# Patient Record
Sex: FEMALE | URBAN - METROPOLITAN AREA
[De-identification: names, ages, dates, MRNs, and addresses within clinical notes are randomized per-mention and may not be internally consistent; named-entity substitution may affect disease eponyms.]

---

## 2021-04-28 ENCOUNTER — OFFICE VISIT (OUTPATIENT)
Dept: PULMONOLOGY | Age: 66
End: 2021-04-28
Payer: COMMERCIAL

## 2021-04-28 VITALS
SYSTOLIC BLOOD PRESSURE: 120 MMHG | WEIGHT: 184 LBS | HEIGHT: 65 IN | HEART RATE: 76 BPM | BODY MASS INDEX: 30.66 KG/M2 | DIASTOLIC BLOOD PRESSURE: 84 MMHG

## 2021-04-28 DIAGNOSIS — G47.33 OBSTRUCTIVE SLEEP APNEA SYNDROME: Primary | ICD-10-CM

## 2021-04-28 DIAGNOSIS — E11.9 CONTROLLED TYPE 2 DIABETES MELLITUS WITHOUT COMPLICATION, WITHOUT LONG-TERM CURRENT USE OF INSULIN (HCC): Chronic | ICD-10-CM

## 2021-04-28 DIAGNOSIS — I10 ESSENTIAL HYPERTENSION: Chronic | ICD-10-CM

## 2021-04-28 DIAGNOSIS — E66.9 NON MORBID OBESITY, UNSPECIFIED OBESITY TYPE: Chronic | ICD-10-CM

## 2021-04-28 PROCEDURE — G8417 CALC BMI ABV UP PARAM F/U: HCPCS | Performed by: INTERNAL MEDICINE

## 2021-04-28 PROCEDURE — G8427 DOCREV CUR MEDS BY ELIG CLIN: HCPCS | Performed by: INTERNAL MEDICINE

## 2021-04-28 PROCEDURE — 2022F DILAT RTA XM EVC RTNOPTHY: CPT | Performed by: INTERNAL MEDICINE

## 2021-04-28 PROCEDURE — 99244 OFF/OP CNSLTJ NEW/EST MOD 40: CPT | Performed by: INTERNAL MEDICINE

## 2021-04-28 RX ORDER — BIOTIN 1 MG
TABLET ORAL
COMMUNITY

## 2021-04-28 RX ORDER — VITAMIN B COMPLEX
1 CAPSULE ORAL DAILY
COMMUNITY

## 2021-04-28 RX ORDER — LOSARTAN POTASSIUM 50 MG/1
50 TABLET ORAL DAILY
COMMUNITY
Start: 2021-01-05

## 2021-04-28 ASSESSMENT — ENCOUNTER SYMPTOMS
ABDOMINAL PAIN: 0
RHINORRHEA: 0
ABDOMINAL DISTENTION: 0
CHEST TIGHTNESS: 0
APNEA: 1
VOMITING: 0
EYE PAIN: 0
NAUSEA: 0
ALLERGIC/IMMUNOLOGIC NEGATIVE: 1
PHOTOPHOBIA: 0
SHORTNESS OF BREATH: 0
CHOKING: 0

## 2021-04-28 ASSESSMENT — SLEEP AND FATIGUE QUESTIONNAIRES
HOW LIKELY ARE YOU TO NOD OFF OR FALL ASLEEP IN A CAR, WHILE STOPPED FOR A FEW MINUTES IN TRAFFIC: 0
HOW LIKELY ARE YOU TO NOD OFF OR FALL ASLEEP WHILE WATCHING TV: 2
HOW LIKELY ARE YOU TO NOD OFF OR FALL ASLEEP WHILE SITTING AND READING: 3

## 2021-04-28 NOTE — LETTER
meds for: HTN and DM. Pt would medically benefit from wt loss for ZEUS (diet, exercise, surgical). Orders Placed This Encounter   Procedures    Sleep Study with PAP Titration         If you have questions or concerns, please do not hesitate to call me. I look forward to following Walter Journey along with you.     Sincerely,      Ramya Escobedo MD    CC providers:  MD [id number] [facility] [address]  Via Fax: [phone]

## 2021-04-28 NOTE — PROGRESS NOTES
Chelita Troncoso MD, Edmundo Nix, [facility]  Tiffanie Kehrt CNP  Laura Jordan CNP Jo[address] [place] [address]  [address] [facility], [address] [facility] [phone]   [facility] [address]non Aceves. [address] [facility]. [address] [phone]     [place] [address]  Dept: [phone]  Loc: [phone]    Assessment:      Visit Diagnoses and Associated Orders     Obstructive sleep apnea syndrome   (New Problem)  -  Primary    Old records reviewed, needs treatment    Sleep Study with PAP Titration [26215 Custom]   - Future Order         Essential hypertension   (Stable)      losartan (COZAAR) 50 MG tablet [15754]           Controlled type 2 diabetes mellitus without complication, without long-term current use of insulin (HCC)   (Stable)           Non morbid obesity, unspecified obesity type   (Not Stable)           ORDERS WITHOUT AN ASSOCIATED DIAGNOSIS    Biotin 1000 MCG TABS [54111]      vitamin D 25 MCG (1000 UT) CAPS [01894]      Multiple Vitamins-Minerals (MULTIVITAMIN ADULTS PO) [060419]      Cyanocobalamin (VITAMIN B 12 PO) [87518]      b complex vitamins capsule [804]             Plan:      Reviewed old records, pertinent data listed in history. Reviewed ZEUS: pathophysiology, diagnosis, complications and treatment. Instructed her not to drive if drowsy. Continue medications per her PCP and other physicians. Limit caffeine use after 3pm. Given severity of his Sx and medical Hx as well has very high probability for ZEUS will do split-night to expedite therapy for his ZEUS. 8 wk f/u after titration. The chronic medical conditions listed are directly related to the primary diagnosis listed above. The management of the primary diagnosis affects the secondary diagnosis and vice versa. This information was analyzed to assess complexity and medical decision making in regards to further testing and management. Continue meds for: HTN and DM.  Pt would medically benefit from wt loss for ZEUS (diet, exercise, surgical). Orders Placed This Encounter   Procedures    Sleep Study with PAP Titration          Subjective:     Patient ID: Gaby Parr is a 60 y.o. female. Chief Complaint   Patient presents with    Snoring     h/o of mild sleep apnea        HPI:      Gaby Parr is a 60 y.o. female referred by Jenny Song MD for a sleep evaluation. She complains of: snoring, excessive daytime sleepiness , non-restorative sleep, nocturia, napping and tossing and turning at night. She denies: cataplexy and hypnagogic hallucinations. Symptoms began 8 years ago, gradually worsening since that time. Previous evaluation and treatment has included-HST done 4/6/15 showed CMS KENDY-11.8/hr. Never offered treatment. Chronic stable medical conditions: HTN and DM  Chronic unstable medical conditions: obesity    DOT/CDL - No  FAA/'s license -No    Previous Report(s) Reviewed: historical medical records, office notes, andreferral letter(s). Pertinent data has been documented. [place] - Total score: 10    Caffeine Intake - None.     Social History     Socioeconomic History    Marital status:      Spouse name: Not on file    Number of children: Not on file    Years of education: Not on file    Highest education level: Not on file   Occupational History    Not on file   Social Needs    Financial resource strain: Not on file    Food insecurity     Worry: Not on file     Inability: Not on file    Transportation needs     Medical: Not on file     Non-medical: Not on file   Tobacco Use    Smoking status: Never Smoker    Smokeless tobacco: Never Used   Substance and Sexual Activity    Alcohol use: Not Currently    Drug use: Never    Sexual activity: Not on file   Lifestyle    Physical activity     Days per week: Not on file     Minutes per session: Not on file    Stress: Not on file   Relationships    Social connections     Talks on phone: Not on file     Gets together: Not on file     Attends Mandaen service: Not on file     Active member of club or organization: Not on file     Attends meetings of clubs or organizations: Not on file     Relationship status: Not on file    Intimate partner violence     Fear of current or ex partner: Not on file     Emotionally abused: Not on file     Physically abused: Not on file     Forced sexual activity: Not on file   Other Topics Concern    Not on file   Social History Narrative    Not on file        Current Outpatient Medications   Medication Sig Dispense Refill    losartan (COZAAR) 50 MG tablet Take 50 mg by mouth daily      Biotin 1000 MCG TABS Take by mouth      vitamin D 25 MCG (1000 UT) CAPS Take by mouth      Multiple Vitamins-Minerals (MULTIVITAMIN ADULTS PO) Take by mouth      Cyanocobalamin (VITAMIN B 12 PO) Take by mouth      b complex vitamins capsule Take 1 capsule by mouth daily       No current facility-administered medications for this visit.         Allergies as of 2021    (No Known Allergies)       Patient Active Problem List   Diagnosis    Essential hypertension    Controlled type 2 diabetes mellitus without complication, without long-term current use of insulin ([address]    Obstructive sleep apnea syndrome    Non morbid obesity, unspecified obesity type       Past Medical History:   Diagnosis Date    Controlled type 2 diabetes mellitus without complication, without long-term current use of insulin ([address] 10/12/2019    Essential hypertension 2018    HTN (hypertension)     Obstructive sleep apnea syndrome 2021    Old records reviewed, needs treatment    Prediabetes        Past Surgical History:   Procedure Laterality Date     SECTION      CHOLECYSTECTOMY, LAPAROSCOPIC      HERNIA REPAIR      MANDIBLE FRACTURE SURGERY      NASAL SEPTUM SURGERY         Family History   Problem Relation Age of Onset    Sleep Apnea Sister     Sleep Apnea Brother  Sleep Apnea Brother        Review of Systems   Constitutional: Positive for fatigue. Negative for activity change and appetite change. HENT: Positive for congestion. Negative for nosebleeds, postnasal drip, rhinorrhea and sneezing. Eyes: Negative for photophobia, pain and visual disturbance. Respiratory: Positive for apnea. Negative for choking, chest tightness and shortness of breath. Cardiovascular: Negative. Gastrointestinal: Negative for abdominal distention, abdominal pain, nausea and vomiting. Endocrine: Negative for cold intolerance and heat intolerance. Genitourinary: Negative for difficulty urinating, dysuria, frequency and urgency. Musculoskeletal: Negative. Negative for neck pain and neck stiffness. Skin: Negative. Allergic/Immunologic: Negative. Neurological: Negative for tremors, seizures, syncope and weakness. Hematological: Negative for adenopathy. Does not bruise/bleed easily. Psychiatric/Behavioral: Positive for sleep disturbance. Negative for agitation, behavioral problems and confusion. Objective:     Vitals:  Weight BMI   Wt Readings from Last 3 Encounters:   04/28/21 184 lb (83.5 kg)    Body mass index is 30.62 kg/m². BP HR SaO2   BP Readings from Last 3 Encounters:   04/28/21 120/84    Pulse Readings from Last 3 Encounters:   04/28/21 76    SpO2 Readings from Last 3 Encounters:   No data found for SpO2        Physical Exam  Vitals signs reviewed. Constitutional:       General: She is not in acute distress. Appearance: Normal appearance. She is well-developed. She is not toxic-appearing or diaphoretic. HENT:      Head: Normocephalic and atraumatic. Not macrocephalic and not microcephalic. Right Ear: External ear normal.      Left Ear: External ear normal.      Nose: Nasal deformity, septal deviation and mucosal edema present. Mouth/Throat:      Lips: Pink. Mouth: Mucous membranes are moist.      Tongue: No lesions.       Palate: No mass. Pharynx: Uvula midline. No oropharyngeal exudate or uvula swelling. Tonsils: No tonsillar exudate or tonsillar abscesses. Comments: Tonsils: normal size  Eyes:      General: Lids are normal.      Extraocular Movements: Extraocular movements intact. Conjunctiva/sclera: Conjunctivae normal.      Pupils: Pupils are equal, round, and reactive to light. Neck:      Musculoskeletal: Normal range of motion. Vascular: No JVD. Trachea: Trachea normal.      Comments: Neck Circ: 15 inches    Cardiovascular:      Rate and Rhythm: Normal rate and regular rhythm. Heart sounds: Normal heart sounds. Pulmonary:      Effort: Pulmonary effort is normal.      Breath sounds: Normal breath sounds. Abdominal:      General: Bowel sounds are normal.   Musculoskeletal:      Comments: No evidence of cyanosis or clubbing of nails   Skin:     General: Skin is warm. Nails: There is no clubbing. Neurological:      General: No focal deficit present. Mental Status: She is alert. Psychiatric:         Attention and Perception: Attention normal.         Mood and Affect: Mood and affect normal.         Speech: Speech normal.         Behavior: Behavior normal.         Thought Content:  Thought content normal.         Electronically signed by Srinivasa Clement MD on4/28/2021 at 2:44 PM

## 2021-04-29 ENCOUNTER — TELEPHONE (OUTPATIENT)
Dept: SLEEP CENTER | Age: 66
End: 2021-04-29

## 2021-04-29 NOTE — TELEPHONE ENCOUNTER
Called to schedule a split night per Mir Almonte. Left  for the pt to return my call.      [employer] insurance

## 2021-05-03 DIAGNOSIS — Z20.822 COVID-19 RULED OUT: Primary | ICD-10-CM

## 2021-05-13 ENCOUNTER — HOSPITAL ENCOUNTER (OUTPATIENT)
Dept: SLEEP CENTER | Age: 66
Discharge: HOME | End: 2021-05-13
Payer: COMMERCIAL

## 2021-05-13 DIAGNOSIS — G47.33 OBSTRUCTIVE SLEEP APNEA SYNDROME: ICD-10-CM

## 2021-05-13 PROCEDURE — 95811 POLYSOM 6/>YRS CPAP 4/> PARM: CPT | Performed by: INTERNAL MEDICINE

## 2021-05-13 PROCEDURE — 95811 POLYSOM 6/>YRS CPAP 4/> PARM: CPT

## 2021-05-18 DIAGNOSIS — G47.33 OBSTRUCTIVE SLEEP APNEA SYNDROME: Primary | ICD-10-CM

## 2021-05-19 ENCOUNTER — TELEPHONE (OUTPATIENT)
Dept: PULMONOLOGY | Age: 66
End: 2021-05-19

## 2021-05-25 ENCOUNTER — TELEPHONE (OUTPATIENT)
Dept: PULMONOLOGY | Age: 66
End: 2021-05-25

## 2021-05-25 NOTE — TELEPHONE ENCOUNTER
Trying to return pt call to review test results. Message left at [phone]. Pt returned call within minutes. Results were reviewed and to schedule a titration study.

## 2021-06-03 ENCOUNTER — HOSPITAL ENCOUNTER (OUTPATIENT)
Dept: SLEEP CENTER | Age: 66
Discharge: HOME | End: 2021-06-03
Payer: COMMERCIAL

## 2021-06-03 DIAGNOSIS — G47.33 OBSTRUCTIVE SLEEP APNEA SYNDROME: ICD-10-CM

## 2021-06-03 PROCEDURE — 95811 POLYSOM 6/>YRS CPAP 4/> PARM: CPT | Performed by: INTERNAL MEDICINE

## 2021-06-03 PROCEDURE — 95811 POLYSOM 6/>YRS CPAP 4/> PARM: CPT

## 2021-06-09 ENCOUNTER — TELEPHONE (OUTPATIENT)
Dept: PULMONOLOGY | Age: 66
End: 2021-06-09

## 2021-06-10 ENCOUNTER — TELEPHONE (OUTPATIENT)
Dept: PULMONOLOGY | Age: 66
End: 2021-06-10

## 2021-06-10 NOTE — TELEPHONE ENCOUNTER
Pt returning call to review titration study. Order to be sent to [facility] F/U scheduled.  Copy of study faxed to pt at [facility] per her request.

## 2021-06-10 NOTE — PROGRESS NOTES
Sergio Argueta         : 1955  [place]    Diagnosis: [x] ZEUS (G47.33) [] CSA (G47.31) [] Apnea (G47.30)   Length of Need: [x] 12 Months [] 99 Months [] Other:    Machine (HONEY!): [x] Respironics Dream Station   2   Auto [] ResMed AirSense     Auto [] Other:     []  CPAP () [x] Bilevel ()   Mode: [] Auto [] Spontaneous    Mode: [x] Auto [] Spontaneous                IPAP max 25 cmH2O  EPAP min 11 cmH2O  PS min 3 cmH2O  PS max 7 cmH2O             Comfort Settings:   - Ramp Pressure: 5 cmH2O                                        - Ramp time: 15 min                                     -  Flex/EPR - 3 full time                                    - For ResMed Bilevel (TiMax-4 sec   TiMin- 0.2 sec)     Humidifier: [x] Heated ()        [x] Water chamber replacement ()/ 1 per 6 months        Mask:   [] Nasal () /1 per 3 months [x] Full Face () /1 per 3 months   [] Patient choice -Size and fit mask [x] Patient Choice - Size and fit mask   [] Dispense:  [] Dispense:    [] Headgear () / 1 per 3 months [x] Headgear () / 1 per 3 months   [] Replacement Nasal Cushion ()/2 per month [x] Interface Replacement ()/1 per month   [] Replacement Nasal Pillows ()/2 per month         Tubing: [x] Heated ()/1 per 3 months    [] Standard ()/1 per 3 months [] Other:           Filters: [x] Non-disposable ()/1 per 6 months     [x] Ultra-Fine, Disposable ()/2 per month        Miscellaneous: [] Chin Strap ()/ 1 per 6 months [] O2 bleed-in:       LPM   [] Oximetry on CPAP/Bilevel []  Other:    [x] Modem: ()         Start Order Date: 06/10/21    MEDICAL JUSTIFICATION:  I, the undersigned, certify that the above prescribed supplies are medically necessary for this patients wellbeing. In my opinion, the supplies are both reasonable and necessary in reference to accepted standards of medicalpractice in treatment of this patients condition.     Francois Swain, MD      NPI: [id number]       Order Signed Date: 06/10/21    Electronically signed by Tad Love MD on 6/10/2021 at 9:30 AM    [address]  1955  [address] [place] [address]  [phone] (home)   There is no such number on file (mobile). Insurance Info (confirm with patient if correct):  Payor/Plan Subscr  Sex Relation Sub.  Ins. ID Effective Group Num

## 2021-06-21 ENCOUNTER — TELEPHONE (OUTPATIENT)
Dept: PULMONOLOGY | Age: 66
End: 2021-06-21

## 2021-06-21 NOTE — TELEPHONE ENCOUNTER
Noe from [facility] called and wants to know if an Air Curve can be ordered?    Noe @ [facility] [phone]

## 2021-06-21 NOTE — PROGRESS NOTES
Sis Zacarias         : 1955  [place]    Diagnosis: [x] ZEUS (G47.33) [] CSA (G47.31) [] Apnea (G47.30)   Length of Need: [x] 12 Months [] 99 Months [] Other:    Machine (HONEY!): [] Respironics Dream Station   2   Auto [x] ResMed AirSense     Auto [] Other:     []  CPAP () [x] Bilevel ()   Mode: [] Auto [] Spontaneous    Mode: [x] Auto [] Spontaneous                IPAP max 25 cmH2O  EPAP min 11 cmH2O  PS 3 cmH2O             Comfort Settings:   - Ramp Pressure: 5 cmH2O                                        - Ramp time: 15 min                                     -  Flex/EPR - 3 full time                                    - For ResMed Bilevel (TiMax-4 sec   TiMin- 0.2 sec)     Humidifier: [x] Heated ()        [x] Water chamber replacement ()/ 1 per 6 months        Mask:   [] Nasal () /1 per 3 months [x] Full Face () /1 per 3 months   [] Patient choice -Size and fit mask [x] Patient Choice - Size and fit mask   [] Dispense:  [] Dispense:    [] Headgear () / 1 per 3 months [x] Headgear () / 1 per 3 months   [] Replacement Nasal Cushion ()/2 per month [x] Interface Replacement ()/1 per month   [] Replacement Nasal Pillows ()/2 per month         Tubing: [x] Heated ()/1 per 3 months    [] Standard ()/1 per 3 months [] Other:           Filters: [x] Non-disposable ()/1 per 6 months     [x] Ultra-Fine, Disposable ()/2 per month        Miscellaneous: [] Chin Strap ()/ 1 per 6 months [] O2 bleed-in:       LPM   [] Oximetry on CPAP/Bilevel []  Other:    [x] Modem: ()         Start Order Date: 21    MEDICAL JUSTIFICATION:  I, the undersigned, certify that the above prescribed supplies are medically necessary for this patients wellbeing. In my opinion, the supplies are both reasonable and necessary in reference to accepted standards of medicalpractice in treatment of this patients condition.     ASHU Zhu      NPI: [id number] Order Signed Date: 21    Electronically signed by ASHU Valentin on 2021 at 4:21 PM    [address]  1955  [facility]  [phone] (home)   There is no such number on file (mobile). Insurance Info (confirm with patient if correct):  Payor/Plan Subscr  Sex Relation Sub.  Ins. ID Effective Group Num

## 2021-07-30 ENCOUNTER — TELEPHONE (OUTPATIENT)
Dept: PULMONOLOGY | Age: 66
End: 2021-07-30

## 2021-07-30 NOTE — TELEPHONE ENCOUNTER
Patient left a voicemail wanting to speak to [facility]. She stated that she is struggling to use her machine. SHe said it is like riding a motorcycle going down the highway. She said the DME made some adjustments, but was wondering if it could be \"slowed down\"? Please call patient at [phone].

## 2021-08-25 ENCOUNTER — OFFICE VISIT (OUTPATIENT)
Dept: PULMONOLOGY | Age: 66
End: 2021-08-25
Payer: COMMERCIAL

## 2021-08-25 VITALS
OXYGEN SATURATION: 97 % | SYSTOLIC BLOOD PRESSURE: 120 MMHG | BODY MASS INDEX: 31.56 KG/M2 | HEART RATE: 82 BPM | DIASTOLIC BLOOD PRESSURE: 88 MMHG | WEIGHT: 189.4 LBS | HEIGHT: 65 IN

## 2021-08-25 DIAGNOSIS — I10 ESSENTIAL HYPERTENSION: Chronic | ICD-10-CM

## 2021-08-25 DIAGNOSIS — G47.33 OBSTRUCTIVE SLEEP APNEA SYNDROME: Primary | Chronic | ICD-10-CM

## 2021-08-25 DIAGNOSIS — E11.9 CONTROLLED TYPE 2 DIABETES MELLITUS WITHOUT COMPLICATION, WITHOUT LONG-TERM CURRENT USE OF INSULIN (HCC): Chronic | ICD-10-CM

## 2021-08-25 DIAGNOSIS — E66.9 NON MORBID OBESITY, UNSPECIFIED OBESITY TYPE: Chronic | ICD-10-CM

## 2021-08-25 PROCEDURE — G8417 CALC BMI ABV UP PARAM F/U: HCPCS | Performed by: NURSE PRACTITIONER

## 2021-08-25 PROCEDURE — G8400 PT W/DXA NO RESULTS DOC: HCPCS | Performed by: NURSE PRACTITIONER

## 2021-08-25 PROCEDURE — 3046F HEMOGLOBIN A1C LEVEL >9.0%: CPT | Performed by: NURSE PRACTITIONER

## 2021-08-25 PROCEDURE — 4040F PNEUMOC VAC/ADMIN/RCVD: CPT | Performed by: NURSE PRACTITIONER

## 2021-08-25 PROCEDURE — 2022F DILAT RTA XM EVC RTNOPTHY: CPT | Performed by: NURSE PRACTITIONER

## 2021-08-25 PROCEDURE — G8427 DOCREV CUR MEDS BY ELIG CLIN: HCPCS | Performed by: NURSE PRACTITIONER

## 2021-08-25 PROCEDURE — 1036F TOBACCO NON-USER: CPT | Performed by: NURSE PRACTITIONER

## 2021-08-25 PROCEDURE — 1123F ACP DISCUSS/DSCN MKR DOCD: CPT | Performed by: NURSE PRACTITIONER

## 2021-08-25 PROCEDURE — 99214 OFFICE O/P EST MOD 30 MIN: CPT | Performed by: NURSE PRACTITIONER

## 2021-08-25 PROCEDURE — 1090F PRES/ABSN URINE INCON ASSESS: CPT | Performed by: NURSE PRACTITIONER

## 2021-08-25 PROCEDURE — 3017F COLORECTAL CA SCREEN DOC REV: CPT | Performed by: NURSE PRACTITIONER

## 2021-08-25 ASSESSMENT — SLEEP AND FATIGUE QUESTIONNAIRES
HOW LIKELY ARE YOU TO NOD OFF OR FALL ASLEEP WHILE SITTING AND READING: 3
HOW LIKELY ARE YOU TO NOD OFF OR FALL ASLEEP WHILE SITTING AND TALKING TO SOMEONE: 0
HOW LIKELY ARE YOU TO NOD OFF OR FALL ASLEEP WHILE LYING DOWN TO REST IN THE AFTERNOON WHEN CIRCUMSTANCES PERMIT: 3
HOW LIKELY ARE YOU TO NOD OFF OR FALL ASLEEP WHEN YOU ARE A PASSENGER IN A CAR FOR AN HOUR WITHOUT A BREAK: 1
HOW LIKELY ARE YOU TO NOD OFF OR FALL ASLEEP WHILE SITTING INACTIVE IN A PUBLIC PLACE: 0
ESS TOTAL SCORE: 11
HOW LIKELY ARE YOU TO NOD OFF OR FALL ASLEEP WHILE WATCHING TV: 3
HOW LIKELY ARE YOU TO NOD OFF OR FALL ASLEEP IN A CAR, WHILE STOPPED FOR A FEW MINUTES IN TRAFFIC: 0
HOW LIKELY ARE YOU TO NOD OFF OR FALL ASLEEP WHILE SITTING QUIETLY AFTER LUNCH WITHOUT ALCOHOL: 1

## 2021-08-25 NOTE — ASSESSMENT & PLAN NOTE
Chronic-Improving: Reviewed PSG Split night and PSG with titration study with patient and provided a copy for her records. Reviewed and analyzed results of physiologic download from patient's machine and reviewed with patient. Supplies and parts as needed for her machine. These are medically necessary. Limit caffeine use after 3pm. Based on the analyzed data will change following settings: EPAP min to 12. Humidity to 6. Verbal and written instruction on PAP equipment cleaning and disinfection schedule provided. Follow up in 3 months or sooner if issues arise.

## 2021-08-25 NOTE — LETTER
[facility]  [place] [address] [place] [address]  Phone: [phone]  Fax: [phone]    August 25, 2021       Patient: Sammie Harmon   MR Number: [id number]   YOB: 1955   Date of Visit: 8/25/2021       Ajit Moore was seen for a follow up visit today. Here is my assessment and plan as well as an attached copy of her visit today:    Non morbid obesity, unspecified obesity type  Chronic-not stable:  Discussed importance of treating obstructive sleep apnea and getting sufficient sleep to assist with weight control. Encouraged her to work on weight loss through diet and exercise. Recommended DASH or Mediterranean diets. Controlled type 2 diabetes mellitus without complication, without long-term current use of insulin (HCC)  Chronic- Stable. Discussed the importance of treating obstructive sleep apnea as part of the management of this disorder. Cont any meds per PCP and other physicians. Essential hypertension   Chronic- Stable. Discussed the importance of treating obstructive sleep apnea as part of the management of this disorder. Cont any meds per PCP and other physicians. Obstructive sleep apnea syndrome   Chronic-Improving: Reviewed PSG Split night and PSG with titration study with patient and provided a copy for her records. Reviewed and analyzed results of physiologic download from patient's machine and reviewed with patient. Supplies and parts as needed for her machine. These are medically necessary. Limit caffeine use after 3pm. Based on the analyzed data will change following settings: EPAP min to 12. Humidity to 6. Verbal and written instruction on PAP equipment cleaning and disinfection schedule provided. Follow up in 3 months or sooner if issues arise. If you have questions or concerns, please do not hesitate to call me. I look forward to following Judy Garzon along with you.     Sincerely,    ASHU Dillon    CC providers: MD [id number]  [place] [address]  Via Fax: [phone]

## 2021-08-25 NOTE — PROGRESS NOTES
Dixie Pena MD, Maisha Silverman, [facility]  Tiffanie Kehrt CNP Lorine Reamer CNP [address]  Brian Zhang [address] [facility], [address] [facility] [phone]   [facility] [address]  Brian Vicente. [facility]. [place] [address] [phone] [address] [facility] [address] [phone]      Assessment/Plan:     1. Obstructive sleep apnea syndrome  Assessment & Plan:   Chronic-Improving: Reviewed PSG Split night and PSG with titration study with patient and provided a copy for her records. Reviewed and analyzed results of physiologic download from patient's machine and reviewed with patient. Supplies and parts as needed for her machine. These are medically necessary. Limit caffeine use after 3pm. Based on the analyzed data will change following settings: EPAP min to 12. Humidity to 6. Verbal and written instruction on PAP equipment cleaning and disinfection schedule provided. Follow up in 3 months or sooner if issues arise. 2. Essential hypertension  Assessment & Plan:   Chronic- Stable. Discussed the importance of treating obstructive sleep apnea as part of the management of this disorder. Cont any meds per PCP and other physicians. 3. Controlled type 2 diabetes mellitus without complication, without long-term current use of insulin (HCC)  Assessment & Plan:  Chronic- Stable. Discussed the importance of treating obstructive sleep apnea as part of the management of this disorder. Cont any meds per PCP and other physicians. 4. Non morbid obesity, unspecified obesity type  Assessment & Plan:  Chronic-not stable:  Discussed importance of treating obstructive sleep apnea and getting sufficient sleep to assist with weight control. Encouraged her to work on weight loss through diet and exercise. Recommended DASH or Mediterranean diets. Reviewed, analyzed, and documented physiologic data from patient's PAP machine.     This information was analyzed to assess complexity and medical decision making in regards to further testing and management. The primary encounter diagnosis was Obstructive sleep apnea syndrome. Diagnoses of Essential hypertension, Controlled type 2 diabetes mellitus without complication, without long-term current use of insulin ([facility] [address] and Non morbid obesity, unspecified obesity type were also pertinent to this visit. The chronic medical conditions listed are directly related to the primary diagnosis listed above. The management of the primary diagnosis affects the secondary diagnosis and vice versa. Subjective:   Subjective   Patient ID: Rod Price is a 73 y.o. female. Chief Complaint   Patient presents with    Sleep Apnea       HPI:  Machine Modem/Download Info:  Compliance (hours/night): 3.99 hrs/night  % of nights >= 4 hrs: 74 %  Download AHI (/hour): 4.2 /HR   Average IPAP Pressure: 16.3 cmH2O  Average EPAP Pressure: 13.3 cmH2O               AUTO BILEVEL - Settings (ResMed)  IPAP Max: 25 cmH2O  EPAP Min: 11 cmH2O  Pressure Support: 3       Comfort Settings  Heated Tubing (Yes/No): Yes  Flex/EPR (0-3): 3 PAP Mask  Mask Type: Full Face mask  Mask Model: [id number]  Mask Size: M  Clinically Relevant Leak: Yes     Rod Price is doing well acclimating to her machine. She has noticed she is less sleepy during the day since she started her machine. Some times she wakes in the middle of the night and feels short of breath. Other times she will wake and if she has had 4 hours of use she will take her mask off. Some oral dryness. She has not adjusted her moisture settings. Pressure feels good and she is waking rested. she denies headaches, congestion, nosebleeds, aerophagia, or drowsiness while driving. Was having some mask issues but has been better since changing to a different mask. Had polysomnography on 5/13/21 which showed AHI-64/hr with low sat-9% and time below 90% of 0 min.  Had titration done on 5/13/21 where thearapeutic portion of the split night study showed minimal sleep and insufficient data to find a therapeutic CPAP pressure. No REM seen on this portion of the study. Recommend patient return for a formal CPAP titration. Titration on 6/3/21 showed improved sleep related breathing disorder with Bilevel. Patient failed CPAP and was changed to bilevel. Recommend IPAP max: 25, EPAP min: 11, PS min: 3, PS max: 7    DME [address]    [place] - Total score: 11    Social History     Socioeconomic History    Marital status:      Spouse name: Not on file    Number of children: Not on file    Years of education: Not on file    Highest education level: Not on file   Occupational History    Not on file   Tobacco Use    Smoking status: Never Smoker    Smokeless tobacco: Never Used   Vaping Use    Vaping Use: Never used   Substance and Sexual Activity    Alcohol use: Not Currently    Drug use: Never    Sexual activity: Not on file   Other Topics Concern    Not on file   Social History Narrative    Not on file     Social Determinants of Health     Financial Resource Strain:     Difficulty of Paying Living Expenses:    Food Insecurity:     Worried About Running Out of Food in the Last Year:     Ran Out of Food in the Last Year:    Transportation Needs:     Lack of Transportation (Medical):      Lack of Transportation (Non-Medical):    Physical Activity:     Days of Exercise per Week:     Minutes of Exercise per Session:    Stress:     Feeling of Stress :    Social Connections:     Frequency of Communication with Friends and Family:     Frequency of Social Gatherings with Friends and Family:     Attends Voodoo Services:     Active Member of Clubs or Organizations:     Attends Club or Organization Meetings:     Marital Status:    Intimate Partner Violence:     Fear of Current or Ex-Partner:     Emotionally Abused:     Physically Abused:     Sexually Abused: Current Outpatient Medications   Medication Instructions    b complex vitamins capsule 1 capsule, Oral, DAILY    Biotin 1000 MCG TABS Oral    Cyanocobalamin (VITAMIN B 12 PO) Oral    losartan (COZAAR) 50 mg, Oral, DAILY    Multiple Vitamins-Minerals (MULTIVITAMIN ADULTS PO) Oral    Probiotic Product (PROBIOTIC ADVANCED PO) Oral    vitamin D 25 MCG (1000 UT) CAPS Oral    Vitamins C E (VITAMIN C & E COMPLEX PO) Oral          Vitals:  Weight BMI   Wt Readings from Last 3 Encounters:   08/25/21 189 lb 6.4 oz (85.9 kg)   04/28/21 184 lb (83.5 kg)    Body mass index is 31.52 kg/m².      BP HR SaO2   BP Readings from Last 3 Encounters:   08/25/21 120/88   04/28/21 120/84    Pulse Readings from Last 3 Encounters:   08/25/21 82   04/28/21 76    SpO2 Readings from Last 3 Encounters:   08/25/21 97%        Electronically signed by ASHU Contreras on 8/25/2021 at 12:58 PM

## 2021-11-17 ENCOUNTER — OFFICE VISIT (OUTPATIENT)
Dept: PULMONOLOGY | Age: 66
End: 2021-11-17
Payer: COMMERCIAL

## 2021-11-17 VITALS
DIASTOLIC BLOOD PRESSURE: 82 MMHG | SYSTOLIC BLOOD PRESSURE: 118 MMHG | HEIGHT: 65 IN | HEART RATE: 79 BPM | BODY MASS INDEX: 31.65 KG/M2 | WEIGHT: 190 LBS | OXYGEN SATURATION: 97 %

## 2021-11-17 DIAGNOSIS — G47.33 OBSTRUCTIVE SLEEP APNEA SYNDROME: Chronic | ICD-10-CM

## 2021-11-17 DIAGNOSIS — I10 ESSENTIAL HYPERTENSION: Chronic | ICD-10-CM

## 2021-11-17 DIAGNOSIS — E11.9 CONTROLLED TYPE 2 DIABETES MELLITUS WITHOUT COMPLICATION, WITHOUT LONG-TERM CURRENT USE OF INSULIN (HCC): Chronic | ICD-10-CM

## 2021-11-17 DIAGNOSIS — E66.9 NON MORBID OBESITY, UNSPECIFIED OBESITY TYPE: Chronic | ICD-10-CM

## 2021-11-17 PROCEDURE — G8427 DOCREV CUR MEDS BY ELIG CLIN: HCPCS | Performed by: NURSE PRACTITIONER

## 2021-11-17 PROCEDURE — 1090F PRES/ABSN URINE INCON ASSESS: CPT | Performed by: NURSE PRACTITIONER

## 2021-11-17 PROCEDURE — 3046F HEMOGLOBIN A1C LEVEL >9.0%: CPT | Performed by: NURSE PRACTITIONER

## 2021-11-17 PROCEDURE — 4040F PNEUMOC VAC/ADMIN/RCVD: CPT | Performed by: NURSE PRACTITIONER

## 2021-11-17 PROCEDURE — 1036F TOBACCO NON-USER: CPT | Performed by: NURSE PRACTITIONER

## 2021-11-17 PROCEDURE — G8484 FLU IMMUNIZE NO ADMIN: HCPCS | Performed by: NURSE PRACTITIONER

## 2021-11-17 PROCEDURE — 1123F ACP DISCUSS/DSCN MKR DOCD: CPT | Performed by: NURSE PRACTITIONER

## 2021-11-17 PROCEDURE — G8400 PT W/DXA NO RESULTS DOC: HCPCS | Performed by: NURSE PRACTITIONER

## 2021-11-17 PROCEDURE — 3017F COLORECTAL CA SCREEN DOC REV: CPT | Performed by: NURSE PRACTITIONER

## 2021-11-17 PROCEDURE — 2022F DILAT RTA XM EVC RTNOPTHY: CPT | Performed by: NURSE PRACTITIONER

## 2021-11-17 PROCEDURE — G8417 CALC BMI ABV UP PARAM F/U: HCPCS | Performed by: NURSE PRACTITIONER

## 2021-11-17 PROCEDURE — 99214 OFFICE O/P EST MOD 30 MIN: CPT | Performed by: NURSE PRACTITIONER

## 2021-11-17 ASSESSMENT — SLEEP AND FATIGUE QUESTIONNAIRES
HOW LIKELY ARE YOU TO NOD OFF OR FALL ASLEEP IN A CAR, WHILE STOPPED FOR A FEW MINUTES IN TRAFFIC: 0
HOW LIKELY ARE YOU TO NOD OFF OR FALL ASLEEP WHILE SITTING INACTIVE IN A PUBLIC PLACE: 0
HOW LIKELY ARE YOU TO NOD OFF OR FALL ASLEEP WHILE WATCHING TV: 3
HOW LIKELY ARE YOU TO NOD OFF OR FALL ASLEEP WHILE SITTING QUIETLY AFTER LUNCH WITHOUT ALCOHOL: 0
HOW LIKELY ARE YOU TO NOD OFF OR FALL ASLEEP WHILE LYING DOWN TO REST IN THE AFTERNOON WHEN CIRCUMSTANCES PERMIT: 3
HOW LIKELY ARE YOU TO NOD OFF OR FALL ASLEEP WHEN YOU ARE A PASSENGER IN A CAR FOR AN HOUR WITHOUT A BREAK: 1
HOW LIKELY ARE YOU TO NOD OFF OR FALL ASLEEP WHILE SITTING AND TALKING TO SOMEONE: 0
ESS TOTAL SCORE: 10
HOW LIKELY ARE YOU TO NOD OFF OR FALL ASLEEP WHILE SITTING AND READING: 3

## 2021-11-17 NOTE — LETTER
[facility] Sleep Medicine  [place] [address] [place] [address]  Phone: [phone]  Fax: [phone]    November 17, 2021       Patient: Eva Burnette   MR Number: [id number]   YOB: 1955   Date of Visit: 11/17/2021       Johan Vizcarra was seen for a follow up visit today. Here is my assessment and plan as well as an attached copy of her visit today:    Obstructive sleep apnea syndrome  Chronic-Stable: Reviewed and analyzed results of physiologic download from patient's machine and reviewed with patient. Supplies and parts as needed for her machine. These are medically necessary. Limit caffeine use after 3pm. Based on the analyzed data will continue with current settings. Patient to work with DME on mask fit and comfort. She is considering [employer]ire. She works at [employer] and may like to see someone there. She will continue working on mask fit in the interim. Verbal and written instruction on PAP equipment cleaning and disinfection schedule provided. Instructed not to drive unless had 4 hrs of effective therapy for her ZEUS the night before. Did review the risks of under or untreated ZEUS including, but not limited to, higher risks of motor vehicle accidents, stroke, heart attacks, and death. She understands and accepts all these risks. Follow up in 3 months or sooner if issues arise. Essential hypertension   Chronic- Stable. Discussed the importance of treating obstructive sleep apnea as part of the management of this disorder. Cont any meds per PCP and other physicians. Controlled type 2 diabetes mellitus without complication, without long-term current use of insulin (HCC)  Chronic- Stable. Discussed the importance of treating obstructive sleep apnea as part of the management of this disorder. Cont any meds per PCP and other physicians.       Non morbid obesity, unspecified obesity type  Chronic-not stable:  Discussed importance of treating obstructive sleep apnea and getting sufficient sleep to assist with weight control. Encouraged her to work on weight loss through diet and exercise. Recommended DASH or Mediterranean diets. If you have questions or concerns, please do not hesitate to call me. I look forward to following Isamar Nielsen along with you.     Sincerely,    ASHU Hendricks     providers:  MD [id number] [facility] [address]  Via Fax: [phone]

## 2021-11-17 NOTE — ASSESSMENT & PLAN NOTE
Chronic-Stable: Reviewed and analyzed results of physiologic download from patient's machine and reviewed with patient. Supplies and parts as needed for her machine. These are medically necessary. Limit caffeine use after 3pm. Based on the analyzed data will continue with current settings. Patient to work with DME on mask fit and comfort. She is considering [employer]. She works at [employer] and may like to see someone there. She will continue working on mask fit in the interim. Verbal and written instruction on PAP equipment cleaning and disinfection schedule provided. Instructed not to drive unless had 4 hrs of effective therapy for her ZEUS the night before. Did review the risks of under or untreated ZEUS including, but not limited to, higher risks of motor vehicle accidents, stroke, heart attacks, and death. She understands and accepts all these risks. Follow up in 3 months or sooner if issues arise.

## 2022-02-16 ENCOUNTER — OFFICE VISIT (OUTPATIENT)
Dept: PULMONOLOGY | Age: 67
End: 2022-02-16
Payer: COMMERCIAL

## 2022-02-16 VITALS
OXYGEN SATURATION: 96 % | DIASTOLIC BLOOD PRESSURE: 70 MMHG | BODY MASS INDEX: 31.62 KG/M2 | HEIGHT: 65 IN | SYSTOLIC BLOOD PRESSURE: 118 MMHG | HEART RATE: 72 BPM | WEIGHT: 189.8 LBS

## 2022-02-16 DIAGNOSIS — E11.9 CONTROLLED TYPE 2 DIABETES MELLITUS WITHOUT COMPLICATION, WITHOUT LONG-TERM CURRENT USE OF INSULIN (HCC): Chronic | ICD-10-CM

## 2022-02-16 DIAGNOSIS — E66.9 NON MORBID OBESITY, UNSPECIFIED OBESITY TYPE: Chronic | ICD-10-CM

## 2022-02-16 DIAGNOSIS — I10 ESSENTIAL HYPERTENSION: Chronic | ICD-10-CM

## 2022-02-16 DIAGNOSIS — G47.33 OBSTRUCTIVE SLEEP APNEA SYNDROME: Primary | Chronic | ICD-10-CM

## 2022-02-16 PROCEDURE — 99214 OFFICE O/P EST MOD 30 MIN: CPT | Performed by: NURSE PRACTITIONER

## 2022-02-16 ASSESSMENT — SLEEP AND FATIGUE QUESTIONNAIRES
HOW LIKELY ARE YOU TO NOD OFF OR FALL ASLEEP IN A CAR, WHILE STOPPED FOR A FEW MINUTES IN TRAFFIC: 0
HOW LIKELY ARE YOU TO NOD OFF OR FALL ASLEEP WHEN YOU ARE A PASSENGER IN A CAR FOR AN HOUR WITHOUT A BREAK: 1
HOW LIKELY ARE YOU TO NOD OFF OR FALL ASLEEP WHILE SITTING AND TALKING TO SOMEONE: 0
ESS TOTAL SCORE: 7
HOW LIKELY ARE YOU TO NOD OFF OR FALL ASLEEP WHILE SITTING AND READING: 3
HOW LIKELY ARE YOU TO NOD OFF OR FALL ASLEEP WHILE LYING DOWN TO REST IN THE AFTERNOON WHEN CIRCUMSTANCES PERMIT: 0
HOW LIKELY ARE YOU TO NOD OFF OR FALL ASLEEP WHILE WATCHING TV: 3
HOW LIKELY ARE YOU TO NOD OFF OR FALL ASLEEP WHILE SITTING QUIETLY AFTER LUNCH WITHOUT ALCOHOL: 0
HOW LIKELY ARE YOU TO NOD OFF OR FALL ASLEEP WHILE SITTING INACTIVE IN A PUBLIC PLACE: 0

## 2022-02-16 NOTE — LETTER
[facility] Sleep Medicine  [place] [address] [address] [place] [address]  Phone: [phone]  Fax: [phone]    February 16, 2022       Patient: Namrata Alfaro   MR Number: [id number]   YOB: 1955   Date of Visit: 2/16/2022       Hanane Knapp was seen for a follow up visit today. Here is my assessment and plan as well as an attached copy of her visit today:    Essential hypertension  Chronic- Stable. Discussed the importance of treating obstructive sleep apnea as part of the management of this disorder. Cont any meds per PCP and other physicians. Controlled type 2 diabetes mellitus without complication, without long-term current use of insulin (HCC)  Chronic- Stable. Discussed the importance of treating obstructive sleep apnea as part of the management of this disorder. Cont any meds per PCP and other physicians. Non morbid obesity, unspecified obesity type  Chronic-not stable:  Discussed importance of treating obstructive sleep apnea and getting sufficient sleep to assist with weight control. Encouraged her to work on weight loss through diet and exercise. Recommended DASH or Mediterranean diets. Obstructive sleep apnea syndrome  Chronic-Stable: Reviewed and analyzed results of physiologic download from patient's machine and reviewed with patient. Supplies and parts as needed for her machine. These are medically necessary. Limit caffeine use after 3pm. Based on the analyzed data will change following settings: Humidity to 6. Stable on her machine at current settings, getting benefit from the use, and having minimal side effects. Encouraged her to use her machine each night, all night. Recommended she work with her DME on mask fit and comfort and provided resources for her to view different styles of masks and mask liners to help control large leak and assist with oral dryness. Showed her how to adjust her moisture settings on her machine in the office today. Verbal and written instruction on PAP equipment cleaning and disinfection schedule provided. Will see back in 4 months or sooner if issues arise. If you have questions or concerns, please do not hesitate to call me. I look forward to following Cata Naranjo along with you.     Sincerely,    ASHU Valentin     providers:  MD [id number] [facility] [address]  Via Fax: [phone]

## 2022-02-16 NOTE — PROGRESS NOTES
Diagnosis: [x] ZEUS (G47.33) [] CSA (G47.31) [] Apnea (G47.30)   Length of Need: [x] 15 Months [] 99 Months [] Other:   Machine (HONEY!): [] Respironics Dream Station      Auto [] ResMed AirSense     Auto [] Other:     []  CPAP () [] Bilevel ()   Mode: [] Auto [] Spontaneous    Mode: [] Auto [] Spontaneous             Comfort Settings:      Humidifier: [] Heated ()        [x] Water chamber replacement ()/ 1 per 6 months        Mask:   [] Nasal () /1 per 3 months [x] Full Face () /1 per 3 months   [] Patient choice -Size and fit mask [x] Patient Choice - Size and fit mask   [] Dispense: [] Dispense:   [] Headgear () / 1 per 3 months [x] Headgear () / 1 per 3 months   [] Replacement Nasal Cushion ()/2 per month [x] Interface Replacement ()/1 per month   [] Replacement Nasal Pillows ()/2 per month         Tubing: [x] Heated ()/1 per 3 months    [] Standard ()/1 per 3 months [] Other:           Filters: [x] Non-disposable ()/1 per 6 months     [x] Ultra-Fine, Disposable ()/2 per month        Miscellaneous: [] Chin Strap ()/ 1 per 6 months [] O2 bleed-in:        LPM   [] Oxymetry on CPAP/Bilevel []  Other:         Start Order Date: 02/16/22    MEDICAL JUSTIFICATION:  I, the undersigned, certify that the above prescribed supplies are medically necessary for this patients wellbeing. In my opinion, the supplies are both reasonable and necessary in reference to accepted standards of medicalpractice in treatment of this patients condition. Werner Lindo NP    NPI: [id number]       Order Signed Date: 02/16/22  [address] [facility]  Pulmonary, Sleep, and Critical Care    Pulmonary, Sleep, and Critical Care  [address] [place] [address].  [address] [facility], [address] [facility] , [address] [facility]  Phone: [phone]    Fax: [address]  1955  [address]  [phone] (home)   [phone] (mobile)      Insurance Info (confirm with patient if correct):  Payor/Plan Subscr  Sex Relation Sub.  Ins. ID Effective Group Num

## 2022-02-16 NOTE — ASSESSMENT & PLAN NOTE
Chronic-Stable: Reviewed and analyzed results of physiologic download from patient's machine and reviewed with patient. Supplies and parts as needed for her machine. These are medically necessary. Limit caffeine use after 3pm. Based on the analyzed data will change following settings: Humidity to 6. Stable on her machine at current settings, getting benefit from the use, and having minimal side effects. Encouraged her to use her machine each night, all night. Recommended she work with her DME on mask fit and comfort and provided resources for her to view different styles of masks and mask liners to help control large leak and assist with oral dryness. Showed her how to adjust her moisture settings on her machine in the office today. Verbal and written instruction on PAP equipment cleaning and disinfection schedule provided. Will see back in 4 months or sooner if issues arise.

## 2022-02-16 NOTE — PROGRESS NOTES
Thierry Cuello MD, [facility], [facility]  [facility] [address]  [address], [address] [phone]   [facility] [address] Katherine Salguero. [address] [place] [address]. [place] [address] [phone] SLEEP MEDICINE  [address] [place], [place] [id number] [phone]      Assessment/Plan:      1. Obstructive sleep apnea syndrome  Assessment & Plan:  Chronic-Stable: Reviewed and analyzed results of physiologic download from patient's machine and reviewed with patient. Supplies and parts as needed for her machine. These are medically necessary. Limit caffeine use after 3pm. Based on the analyzed data will change following settings: Humidity to 6. Stable on her machine at current settings, getting benefit from the use, and having minimal side effects. Encouraged her to use her machine each night, all night. Recommended she work with her DME on mask fit and comfort and provided resources for her to view different styles of masks and mask liners to help control large leak and assist with oral dryness. Showed her how to adjust her moisture settings on her machine in the office today. Verbal and written instruction on PAP equipment cleaning and disinfection schedule provided. Will see back in 4 months or sooner if issues arise. 2. Essential hypertension  Assessment & Plan:  Chronic- Stable. Discussed the importance of treating obstructive sleep apnea as part of the management of this disorder. Cont any meds per PCP and other physicians. 3. Controlled type 2 diabetes mellitus without complication, without long-term current use of insulin (HCC)  Assessment & Plan:  Chronic- Stable. Discussed the importance of treating obstructive sleep apnea as part of the management of this disorder. Cont any meds per PCP and other physicians.     4. Non morbid obesity, unspecified obesity type  Assessment & Plan:  Chronic-not stable: Discussed importance of treating obstructive sleep apnea and getting sufficient sleep to assist with weight control. Encouraged her to work on weight loss through diet and exercise. Recommended DASH or Mediterranean diets. Reviewed, analyzed, and documented physiologic data from patient's PAP machine. This information was analyzed to assess complexity and medical decision making in regards to further testing and management. The primary encounter diagnosis was Obstructive sleep apnea syndrome. Diagnoses of Essential hypertension, Controlled type 2 diabetes mellitus without complication, without long-term current use of insulin ([facility] [address] and Non morbid obesity, unspecified obesity type were also pertinent to this visit. The chronic medical conditions listed are directly related to the primary diagnosis listed above. The management of the primary diagnosis affects the secondary diagnosis and vice versa. Subjective:   Subjective   Patient ID: Rylie Chaves is a 73 y.o. female. Chief Complaint   Patient presents with    Sleep Apnea       HPI:  Machine Modem/Download Info:  Compliance (hours/night): 4.11 hrs/night  % of nights >= 4 hrs: 81 %  Download AHI (/hour): 2.3 /HR   Average IPAP Pressure: 15.9 cmH2O  Average EPAP Pressure: 12.9 cmH2O               AUTO BILEVEL - Settings (ResMed)  IPAP Max: 25 cmH2O  EPAP Min: 12 cmH2O  Pressure Support: 3       Comfort Settings  Heated Tubing (Yes/No): Yes PAP Mask  Mask Type: Full Face mask  Clinically Relevant Leak: Yes     Rylie Chaves reports she is doing well with her machine. Some weeks she struggles with the machine and others not. Sometimes she will fall asleep without the machine and then wake up to put it on. Sometimes she does not wake up until early in the morning and will only get a few hours of use in. Pressure feels good and she is waking rested for the most part. Mask will leak around her cheeks and into her eyes.  She is also having a lot of oral dryness. She has not tried adjusting the moisture settings on her machine or contacted her DME to try a different mask. She has been waiting for updated supplies and has called the DME a few times and is told they will send them but she never receives them. she denies headaches, congestion, nosebleeds, dryness, aerophagia, or drowsiness while driving. [address]    [place] - Total score: 7    Social History     Socioeconomic History    Marital status:      Spouse name: Not on file    Number of children: Not on file    Years of education: Not on file    Highest education level: Not on file   Occupational History    Not on file   Tobacco Use    Smoking status: Never Smoker    Smokeless tobacco: Never Used   Vaping Use    Vaping Use: Never used   Substance and Sexual Activity    Alcohol use: Not Currently    Drug use: Never    Sexual activity: Not on file   Other Topics Concern    Not on file   Social History Narrative    Not on file     Social Determinants of Health     Financial Resource Strain:     Difficulty of Paying Living Expenses: Not on file   Food Insecurity:     Worried About Running Out of Food in the Last Year: Not on file    [place] of Food in the Last Year: Not on file   Transportation Needs:     Lack of Transportation (Medical): Not on file    Lack of Transportation (Non-Medical):  Not on file   Physical Activity:     Days of Exercise per Week: Not on file    Minutes of Exercise per Session: Not on file   Stress:     Feeling of Stress : Not on file   Social Connections:     Frequency of Communication with Friends and Family: Not on file    Frequency of Social Gatherings with Friends and Family: Not on file    Attends Shinto Services: Not on file    Active Member of Clubs or Organizations: Not on file    Attends Club or Organization Meetings: Not on file    Marital Status: Not on file   Intimate Partner Violence:     Fear of Current or Ex-Partner: Not on file    Emotionally Abused: Not on file    Physically Abused: Not on file    Sexually Abused: Not on file   Housing Stability:     Unable to Pay for Housing in the Last Year: Not on file    Number of Places Lived in the Last Year: Not on file    Unstable Housing in the Last Year: Not on file       Current Outpatient Medications   Medication Instructions    b complex vitamins capsule 1 capsule, Oral, DAILY    Biotin 1000 MCG TABS Oral    Cyanocobalamin (VITAMIN B 12 PO) Oral    losartan (COZAAR) 50 mg, Oral, DAILY    Multiple Vitamins-Minerals (MULTIVITAMIN ADULTS PO) Oral    Probiotic Product (PROBIOTIC ADVANCED PO) Oral    vitamin D 25 MCG (1000 UT) CAPS Oral    Vitamins C E (VITAMIN C & E COMPLEX PO) Oral          Vitals:  Weight BMI   Wt Readings from Last 3 Encounters:   02/16/22 189 lb 12.8 oz (86.1 kg)   11/17/21 190 lb (86.2 kg)   08/25/21 189 lb 6.4 oz (85.9 kg)    Body mass index is 31.58 kg/m².      BP HR SaO2   BP Readings from Last 3 Encounters:   02/16/22 118/70   11/17/21 118/82   08/25/21 120/88    Pulse Readings from Last 3 Encounters:   02/16/22 72   11/17/21 79   08/25/21 82    SpO2 Readings from Last 3 Encounters:   02/16/22 96%   11/17/21 97%   08/25/21 97%        Electronically signed by ASHU Pozo on 2/16/2022 at 5:58 PM

## 2023-01-30 NOTE — PROGRESS NOTES
Lily Valerio MD, [place], [facility]  Tiffanie Kehrt CNP  Glendy Mcnulty CNP [address]  [address] [facility], [address] [facility]- [phone]   [facility] [address] Betty Gaston. [address] [facility]. [place] [address] [phone]     [address] [facility]  [address] [phone]      Assessment/Plan:      1. Obstructive sleep apnea syndrome  Assessment & Plan:  Chronic-Stable: Reviewed and analyzed results of physiologic download from patient's machine and reviewed with patient. Supplies and parts as needed for her machine. These are medically necessary. Limit caffeine use after 3pm. Based on the analyzed data will continue with current settings. Patient to work with DME on mask fit and comfort. She is considering [employer]. She works at [employer] and may like to see someone there. She will continue working on mask fit in the interim. Verbal and written instruction on PAP equipment cleaning and disinfection schedule provided. Instructed not to drive unless had 4 hrs of effective therapy for her ZEUS the night before. Did review the risks of under or untreated ZEUS including, but not limited to, higher risks of motor vehicle accidents, stroke, heart attacks, and death. She understands and accepts all these risks. Follow up in 3 months or sooner if issues arise. 2. Essential hypertension  Assessment & Plan:   Chronic- Stable. Discussed the importance of treating obstructive sleep apnea as part of the management of this disorder. Cont any meds per PCP and other physicians. 3. Controlled type 2 diabetes mellitus without complication, without long-term current use of insulin (HCC)  Assessment & Plan:  Chronic- Stable. Discussed the importance of treating obstructive sleep apnea as part of the management of this disorder. Cont any meds per PCP and other physicians.     4. Non morbid obesity, unspecified obesity type  Assessment & Plan:  Chronic-not stable:  Discussed importance of treating obstructive sleep apnea and getting sufficient sleep to assist with weight control. Encouraged her to work on weight loss through diet and exercise. Recommended DASH or Mediterranean diets. Reviewed, analyzed, and documented physiologic data from patient's PAP machine. This information was analyzed to assess complexity and medical decision making in regards to further testing and management. Diagnoses of Obstructive sleep apnea syndrome, Essential hypertension, Controlled type 2 diabetes mellitus without complication, without long-term current use of insulin ([facility] [address] and Non morbid obesity, unspecified obesity type were pertinent to this visit. The chronic medical conditions listed are directly related to the primary diagnosis listed above. The management of the primary diagnosis affects the secondary diagnosis and vice versa. Subjective:   Subjective   Patient ID: Roel Yeboah is a 73 y.o. female. Chief Complaint   Patient presents with    Sleep Apnea       HPI:  Machine Modem/Download Info:  Compliance (hours/night): 4.54 hrs/night  % of nights >= 4 hrs: 80 %  Download AHI (/hour): 1.4 /HR   Average IPAP Pressure: 15.9 cmH2O  Average EPAP Pressure: 12.9 cmH2O               AUTO BILEVEL - Settings (ResMed)  IPAP Max: 25 cmH2O  EPAP Min: 12 cmH2O  Pressure Support: 3       Comfort Settings  Heated Tubing (Yes/No): Yes PAP Mask  Clinically Relevant Leak: Yes     Roel Yeboah reports she is doing well with her machine. Still struggles to use machine and getting use to the mask. She will count down the time until she gets to 4 hours of use. She didn't use it one night and was able to sleep through the whole night. Pressure feels good and she is waking rested. she denies headaches, congestion, nosebleeds, dryness, aerophagia, or drowsiness while driving. May be interested in [employer].      [facility]    [place] - Total score: 10    Social History     Socioeconomic History    Marital status:      Spouse name: Not on file    Number of children: Not on file    Years of education: Not on file    Highest education level: Not on file   Occupational History    Not on file   Tobacco Use    Smoking status: Never Smoker    Smokeless tobacco: Never Used   Vaping Use    Vaping Use: Never used   Substance and Sexual Activity    Alcohol use: Not Currently    Drug use: Never    Sexual activity: Not on file   Other Topics Concern    Not on file   Social History Narrative    Not on file     Social Determinants of Health     Financial Resource Strain:     Difficulty of Paying Living Expenses: Not on file   Food Insecurity:     Worried About Running Out of Food in the Last Year: Not on file    Peterson of Food in the Last Year: Not on file   Transportation Needs:     Lack of Transportation (Medical): Not on file    Lack of Transportation (Non-Medical):  Not on file   Physical Activity:     Days of Exercise per Week: Not on file    Minutes of Exercise per Session: Not on file   Stress:     Feeling of Stress : Not on file   Social Connections:     Frequency of Communication with Friends and Family: Not on file    Frequency of Social Gatherings with Friends and Family: Not on file    Attends Roman Catholic Services: Not on file    Active Member of [address] [place] [address] [employer] or Organizations: Not on file    Attends Club or Organization Meetings: Not on file    Marital Status: Not on file   Intimate Partner Violence:     Fear of Current or Ex-Partner: Not on file    Emotionally Abused: Not on file    Physically Abused: Not on file    Sexually Abused: Not on file   Housing Stability:     Unable to Pay for Housing in the Last Year: Not on file    Number of [place] in the Last Year: Not on file    Unstable Housing in the Last Year: Not on file       Current Outpatient Medications   Medication Instructions    b complex vitamins capsule 1 capsule, Oral, DAILY    Biotin 1000 MCG TABS Oral    Cyanocobalamin (VITAMIN B 12 PO) Oral    losartan (COZAAR) 50 mg, Oral, DAILY    Multiple Vitamins-Minerals (MULTIVITAMIN ADULTS PO) Oral    Probiotic Product (PROBIOTIC ADVANCED PO) Oral    vitamin D 25 MCG (1000 UT) CAPS Oral    Vitamins C E (VITAMIN C & E COMPLEX PO) Oral          Vitals:  Weight BMI   Wt Readings from Last 3 Encounters:   11/17/21 190 lb (86.2 kg)   08/25/21 189 lb 6.4 oz (85.9 kg)   04/28/21 184 lb (83.5 kg)    Body mass index is 31.62 kg/m².      BP HR SaO2   BP Readings from Last 3 Encounters:   11/17/21 118/82   08/25/21 120/88   04/28/21 120/84    Pulse Readings from Last 3 Encounters:   11/17/21 79   08/25/21 82   04/28/21 76    SpO2 Readings from Last 3 Encounters:   11/17/21 97%   08/25/21 97%        Electronically signed by ASHU Mcconnell on 11/17/2021 at 3:18 PM Drysol Counseling:  I discussed with the patient the risks of drysol/aluminum chloride including but not limited to skin rash, itching, irritation, burning.

## 2023-02-21 ENCOUNTER — OFFICE VISIT (OUTPATIENT)
Dept: PULMONOLOGY | Age: 68
End: 2023-02-21
Payer: COMMERCIAL

## 2023-02-21 VITALS
HEART RATE: 80 BPM | BODY MASS INDEX: 31.49 KG/M2 | WEIGHT: 189 LBS | DIASTOLIC BLOOD PRESSURE: 72 MMHG | HEIGHT: 65 IN | SYSTOLIC BLOOD PRESSURE: 128 MMHG | OXYGEN SATURATION: 95 %

## 2023-02-21 DIAGNOSIS — E66.9 NON MORBID OBESITY, UNSPECIFIED OBESITY TYPE: Chronic | ICD-10-CM

## 2023-02-21 DIAGNOSIS — E11.9 CONTROLLED TYPE 2 DIABETES MELLITUS WITHOUT COMPLICATION, WITHOUT LONG-TERM CURRENT USE OF INSULIN (HCC): Chronic | ICD-10-CM

## 2023-02-21 DIAGNOSIS — I10 ESSENTIAL HYPERTENSION: Chronic | ICD-10-CM

## 2023-02-21 DIAGNOSIS — G47.33 OBSTRUCTIVE SLEEP APNEA SYNDROME: Primary | Chronic | ICD-10-CM

## 2023-02-21 DIAGNOSIS — J30.9 ALLERGIC RHINITIS, UNSPECIFIED SEASONALITY, UNSPECIFIED TRIGGER: ICD-10-CM

## 2023-02-21 PROCEDURE — 3074F SYST BP LT 130 MM HG: CPT | Performed by: INTERNAL MEDICINE

## 2023-02-21 PROCEDURE — 3078F DIAST BP <80 MM HG: CPT | Performed by: INTERNAL MEDICINE

## 2023-02-21 PROCEDURE — 99214 OFFICE O/P EST MOD 30 MIN: CPT | Performed by: INTERNAL MEDICINE

## 2023-02-21 PROCEDURE — 1123F ACP DISCUSS/DSCN MKR DOCD: CPT | Performed by: INTERNAL MEDICINE

## 2023-02-21 ASSESSMENT — SLEEP AND FATIGUE QUESTIONNAIRES
HOW LIKELY ARE YOU TO NOD OFF OR FALL ASLEEP WHILE SITTING AND TALKING TO SOMEONE: 0
HOW LIKELY ARE YOU TO NOD OFF OR FALL ASLEEP WHEN YOU ARE A PASSENGER IN A CAR FOR AN HOUR WITHOUT A BREAK: 2
HOW LIKELY ARE YOU TO NOD OFF OR FALL ASLEEP WHILE SITTING QUIETLY AFTER LUNCH WITHOUT ALCOHOL: 0
HOW LIKELY ARE YOU TO NOD OFF OR FALL ASLEEP WHILE SITTING AND READING: 3
HOW LIKELY ARE YOU TO NOD OFF OR FALL ASLEEP IN A CAR, WHILE STOPPED FOR A FEW MINUTES IN TRAFFIC: 0
HOW LIKELY ARE YOU TO NOD OFF OR FALL ASLEEP WHILE LYING DOWN TO REST IN THE AFTERNOON WHEN CIRCUMSTANCES PERMIT: 3
HOW LIKELY ARE YOU TO NOD OFF OR FALL ASLEEP WHILE SITTING INACTIVE IN A PUBLIC PLACE: 1
ESS TOTAL SCORE: 12
HOW LIKELY ARE YOU TO NOD OFF OR FALL ASLEEP WHILE WATCHING TV: 3

## 2023-02-21 NOTE — ASSESSMENT & PLAN NOTE
Chronic-with progression/exacerbation:  Continue medications per her PCP and other physicians. Instructed not to drive unless had 4 hrs of effective therapy for her ZEUS the night before. Did review the risks of under or untreated ZEUS including, but not limited to, higher risks of motor vehicle accidents, stroke, heart attacks, and death. She understands and accepts all these risks. Discussed importance of using her machine each night, all night. Need to set alarm if she falls asleep in the couch that she gets up and goes right to the bedroom to put her mask on. We will try adjusting her ramp pressure up by 2 steps to see if that helps with some of the shortness of breath. We will also have the patient see ENT for the chronic congestion which could be worsening her dryness. We discussed the possibility of hypoglossal nerve stimulation if she is not able to consistently use her PAP machine.

## 2023-02-21 NOTE — PROGRESS NOTES
Jessica Brunner CNP [address], [address] [facility] [phone]   [address]  [place] [address] [phone] [address] [address] [phone]      Assessment/Plan:      1. Obstructive sleep apnea syndrome  Assessment & Plan:  Chronic-with progression/exacerbation:  Continue medications per her PCP and other physicians. Instructed not to drive unless had 4 hrs of effective therapy for her ZEUS the night before. Did review the risks of under or untreated ZEUS including, but not limited to, higher risks of motor vehicle accidents, stroke, heart attacks, and death. She understands and accepts all these risks. Discussed importance of using her machine each night, all night. Need to set alarm if she falls asleep in the couch that she gets up and goes right to the bedroom to put her mask on. We will try adjusting her ramp pressure up by 2 steps to see if that helps with some of the shortness of breath. We will also have the patient see ENT for the chronic congestion which could be worsening her dryness. We discussed the possibility of hypoglossal nerve stimulation if she is not able to consistently use her PAP machine. 2. Essential hypertension  Assessment & Plan:  Chronic- Stable. Discussed the importance of treating sleep apnea as part of the management of this disorder. Cont any meds per PCP and other physicians. 3. Controlled type 2 diabetes mellitus without complication, without long-term current use of insulin (HCC)  Assessment & Plan:  Chronic- Stable. Discussed the importance of treating sleep apnea as part of the management of this disorder. Cont any meds per PCP and other physicians.    4. Allergic rhinitis, unspecified seasonality, unspecified trigger  Assessment & Plan:  Chronic- worsening: Discussed with the patient continue nasal sinus rinses at nighttime along with over-the-counter nasal steroid spray and Astepro. We will also consult ENT for advice and opinion on her chronic congestion/cough which seems to failed allergy shots. Orders:  -     Yakov Wong DO, Otolaryngology, [facility]  5. Non morbid obesity, unspecified obesity type  Assessment & Plan:  Chronic-not stable:  Discussed importance of treating obstructive sleep apnea and getting sufficient sleep to assist with weight control. Encouraged her to work on weight loss through diet and exercise. Recommended DASH or Mediterranean diets. Reviewed, analyzed, and documented physiologic data from patient's PAP machine. This information was analyzed to assess complexity and medical decision making in regards to further testing and management. The primary encounter diagnosis was Obstructive sleep apnea syndrome. Diagnoses of Essential hypertension, Controlled type 2 diabetes mellitus without complication, without long-term current use of insulin ([facility] [address] Allergic rhinitis, unspecified seasonality, unspecified trigger, and Non morbid obesity, unspecified obesity type were also pertinent to this visit. The chronic medical conditions listed are directly related to the primary diagnosis listed above. The management of the primary diagnosis affects the secondary diagnosis and vice versa. Subjective:   Subjective   Patient ID: Ele Peacock is a 78 y.o. female. Chief Complaint   Patient presents with    Sleep Apnea       HPI:  Machine Modem/Download Info:  Compliance (hours/night): 3 hrs/night  % of nights >= 4 hrs: 53 %  Download AHI (/hour): 1 /HR  Average IPAP Pressure: 15.4 cmH2O  Average EPAP Pressure: 12.4 cmH2O   AUTO BILEVEL - Settings (ResMed)  IPAP Max: 25 cmH2O  EPAP Min: 12 cmH2O  Pressure Support: 3               Struggling be consistent with using her machine.   She often falls asleep in the couch prior to getting her mask on. When she does put her machine on at times she wakes up feeling short of breath when the machine is first ramping up. She is finally gotten a fullface mask that seems to fit her better but she struggled with a lot of mouth dryness. She is having issues with congestion is currently getting allergy shots and has seen an ENT in the past but is not helping. Her diabetes medications may be adding to her dryness as well. She can use her machine longer she does feel better and she knows she needs to be consistent with using it.     [facility]    [place] Sleepiness Score: 12    Social History     Socioeconomic History    Marital status:      Spouse name: Not on file    Number of children: Not on file    Years of education: Not on file    Highest education level: Not on file   Occupational History    Not on file   Tobacco Use    Smoking status: Never    Smokeless tobacco: Never   Vaping Use    Vaping Use: Never used   Substance and Sexual Activity    Alcohol use: Not Currently    Drug use: Never    Sexual activity: Not on file   Other Topics Concern    Not on file   Social History Narrative    Not on file     Social Determinants of Health     Financial Resource Strain: Not on file   Food Insecurity: Not on file   Transportation Needs: Not on file   Physical Activity: Not on file   Stress: Not on file   Social Connections: Not on file   Intimate Partner Violence: Not on file   Housing Stability: Not on file       Current Outpatient Medications   Medication Instructions    b complex vitamins capsule 1 capsule, Oral, DAILY    Biotin 1000 MCG TABS Oral    Cyanocobalamin (VITAMIN B 12 PO) Oral    losartan (COZAAR) 50 mg, Oral, DAILY    Multiple Vitamins-Minerals (MULTIVITAMIN ADULTS PO) Oral    Probiotic Product (PROBIOTIC ADVANCED PO) Oral    vitamin D 25 MCG (1000 UT) CAPS Oral    Vitamins C E (VITAMIN C & E COMPLEX PO) Oral          Vitals:  Weight BMI   Wt Readings from Last 3 Encounters:   02/21/23 189 lb (85.7 kg)   02/16/22 189 lb 12.8 oz (86.1 kg)   11/17/21 190 lb (86.2 kg)    Body mass index is 31.45 kg/m².      BP HR SaO2   BP Readings from Last 3 Encounters:   02/21/23 128/72   02/16/22 118/70   11/17/21 118/82    Pulse Readings from Last 3 Encounters:   02/21/23 80   02/16/22 72   11/17/21 79    SpO2 Readings from Last 3 Encounters:   02/21/23 95%   02/16/22 96%   11/17/21 97%        Electronically signed by Maria Elena Merchant MD on 2/21/2023 at 11:49 AM

## 2023-02-21 NOTE — LETTER
[facility]  [address] [facility]  [address] [address]  Phone: [phone]  Fax: [phone]    Lisa Garzon MD    February 21, 2023     [id number]  [place] [address]    Patient: Ann-Marie Leiva   MR Number: [id number]   YOB: 1955   Date of Visit: 2/21/2023       Dear Belinda Jasmine:    Thank you for referring Luis M Hudson to me for evaluation/treatment. Below are the relevant portions of my assessment and plan of care. 1. Obstructive sleep apnea syndrome  Assessment & Plan:  Chronic-with progression/exacerbation:  Continue medications per her PCP and other physicians. Instructed not to drive unless had 4 hrs of effective therapy for her ZEUS the night before. Did review the risks of under or untreated ZEUS including, but not limited to, higher risks of motor vehicle accidents, stroke, heart attacks, and death. She understands and accepts all these risks. Discussed importance of using her machine each night, all night. Need to set alarm if she falls asleep in the couch that she gets up and goes right to the bedroom to put her mask on. We will try adjusting her ramp pressure up by 2 steps to see if that helps with some of the shortness of breath. We will also have the patient see ENT for the chronic congestion which could be worsening her dryness. We discussed the possibility of hypoglossal nerve stimulation if she is not able to consistently use her PAP machine. 2. Essential hypertension  Assessment & Plan:  Chronic- Stable. Discussed the importance of treating sleep apnea as part of the management of this disorder. Cont any meds per PCP and other physicians. 3. Controlled type 2 diabetes mellitus without complication, without long-term current use of insulin (HCC)  Assessment & Plan:  Chronic- Stable. Discussed the importance of treating sleep apnea as part of the management of this disorder. Cont any meds per PCP and other physicians.   4. Allergic rhinitis, unspecified seasonality, unspecified trigger  Assessment & Plan:  Chronic- worsening: Discussed with the patient continue nasal sinus rinses at nighttime along with over-the-counter nasal steroid spray and Astepro.  We will also consult ENT for advice and opinion on her chronic congestion/cough which seems to failed allergy shots.   Orders:  -     Gm Shearer DO, Otolaryngology, [facility]  5. Non morbid obesity, unspecified obesity type  Assessment & Plan:  Chronic-not stable:  Discussed importance of treating obstructive sleep apnea and getting sufficient sleep to assist with weight control.  Encouraged her to work on weight loss through diet and exercise. Recommended DASH or Mediterranean diets.     Reviewed, analyzed, and documented physiologic data from patient's PAP machine.    This information was analyzed to assess complexity and medical decision making in regards to further testing and management.    The primary encounter diagnosis was Obstructive sleep apnea syndrome. Diagnoses of Essential hypertension, Controlled type 2 diabetes mellitus without complication, without long-term current use of insulin ([facility]), Allergic rhinitis, unspecified seasonality, unspecified trigger, and Non morbid obesity, unspecified obesity type were also pertinent to this visit.  The chronic medical conditions listed are directly related to the primary diagnosis listed above.  The management of the primary diagnosis affects the secondary diagnosis and vice versa.       If you have questions, please do not hesitate to call me. I look forward to following Chloe along with you.    Sincerely,      NGUYỄN OLIVAREZ MD

## 2023-02-21 NOTE — ASSESSMENT & PLAN NOTE
Chronic- worsening: Discussed with the patient continue nasal sinus rinses at nighttime along with over-the-counter nasal steroid spray and Astepro. We will also consult ENT for advice and opinion on her chronic congestion/cough which seems to failed allergy shots.

## 2023-02-21 NOTE — PROGRESS NOTES
Ruthy Hu         : 1955    Diagnosis: [x] ZEUS (G47.33) [] CSA (G47.31) [] Apnea (G47.30)   Length of Need: [x] 18 Months [] 99 Months [] Other:    Machine (HONEY!): [] Respironics Dream Station      Auto [] ResMed AirSense     Auto [] Other:     []  CPAP () [] Bilevel ()   Mode: [] Auto [] Spontaneous    Mode: [] Auto [] Spontaneous              Comfort Settings:        Humidifier: [] Heated ()        [x] Water chamber replacement ()/ 1 per 6 months        Mask:   [] Nasal () /1 per 3 months [x] Full Face () /1 per 3 months   [] Patient choice -Size and fit mask [x] Patient Choice - Size and fit mask   [] Dispense:  [] Dispense:    [] Headgear () / 1 per 3 months [x] Headgear () / 1 per 3 months   [] Replacement Nasal Cushion ()/2 per month [x] Interface Replacement ()/1 per month   [] Replacement Nasal Pillows ()/2 per month         Tubing: [x] Heated ()/1 per 3 months    [] Standard ()/1 per 3 months [] Other:           Filters: [x] Non-disposable ()/1 per 6 months     [x] Ultra-Fine, Disposable ()/2 per month        Miscellaneous: [] Chin Strap ()/ 1 per 6 months [] O2 bleed-in:       LPM   [] Oximetry on CPAP/Bilevel []  Other:    [x] Modem: ()         Start Order Date: 23    MEDICAL JUSTIFICATION:  I, the undersigned, certify that the above prescribed supplies are medically necessary for this patients wellbeing. In my opinion, the supplies are both reasonable and necessary in reference to accepted standards of medicalpractice in treatment of this patients condition.     Monserrat Winn MD      NPI: [id number]       Order Signed Date: 23    Electronically signed by Monserrat Winn MD on 2023 at 11:49 AM    Ruthy Hu  1955  [facility]  [phone] (home)   [phone] (mobile)      Insurance Info (confirm with patient if correct):  Payer/Plan Subscr  Sex Relation Sub.  Ins. ID Effective Group Num

## 2023-05-10 ENCOUNTER — OFFICE VISIT (OUTPATIENT)
Dept: DERMATOLOGY | Age: 68
End: 2023-05-10
Payer: COMMERCIAL

## 2023-05-10 DIAGNOSIS — L65.0 TELOGEN EFFLUVIUM: Primary | ICD-10-CM

## 2023-05-10 PROCEDURE — 99203 OFFICE O/P NEW LOW 30 MIN: CPT | Performed by: DERMATOLOGY

## 2023-05-10 PROCEDURE — 1123F ACP DISCUSS/DSCN MKR DOCD: CPT | Performed by: DERMATOLOGY

## 2023-05-10 RX ORDER — MINOXIDIL 2 %
SOLUTION, NON-ORAL TOPICAL
COMMUNITY
Start: 2023-04-04

## 2023-05-10 RX ORDER — METFORMIN HYDROCHLORIDE 500 MG/1
500 TABLET, EXTENDED RELEASE ORAL
COMMUNITY

## 2023-05-10 RX ORDER — MULTIVIT WITH MINERALS/LUTEIN
1000 TABLET ORAL DAILY
COMMUNITY

## 2023-05-10 NOTE — PROGRESS NOTES
[facility]  Janeen George MD  [address]  1955    78 y.o. female     Date of Visit: 5/10/2023    Chief Complaint: hair loss    History of Present Illness:    She presents today for hair shedding and hair loss. Has always had thin hair. Noticed more in the last few months, especially recently. Complains of increased shedding - coworkers notice on her clothes. She sees it around her house and in the car. No surgeries in the last 6 months. Had the Flu, possible COVID in Feb when she was quite ill. Meds: Metformin started in Feb    No hormone therapy. No rapid weight loss. Has had some fatigue but has sleep apnea and now on CPAP. Review of Systems:  Gen: Feels well, good sense of health.  + for some fatigue. Past Medical History, Family History, Surgical History, Medications and Allergies reviewed.     Past Medical History:   Diagnosis Date    Controlled type 2 diabetes mellitus without complication, without long-term current use of insulin ([facility] [address] 10/12/2019    Essential hypertension 2018    HTN (hypertension)     Obstructive sleep apnea syndrome 2021    Old records reviewed, needs treatment    Prediabetes      Past Surgical History:   Procedure Laterality Date     SECTION      CHOLECYSTECTOMY, LAPAROSCOPIC      HERNIA REPAIR      MANDIBLE FRACTURE SURGERY      NASAL SEPTUM SURGERY         Allergies   Allergen Reactions    Codeine Nausea And Vomiting and Nausea Only    Penicillins Hives and Rash     Outpatient Medications Marked as Taking for the 5/10/23 encounter (Office Visit) with Marcus Robison MD   Medication Sig Dispense Refill    vitamin E 1000 units capsule Take 1 capsule by mouth daily      metFORMIN (GLUCOPHAGE-XR) 500 MG extended release tablet Take 1 tablet by mouth daily (with breakfast)      Vitamins C E (VITAMIN C & E COMPLEX PO) Take by mouth      Probiotic Product (PROBIOTIC ADVANCED PO) Take by mouth

## 2023-05-11 LAB
A/G RATIO: 1.5 (ref 1–2.1)
ALBUMIN SERPL-MCNC: 4.2 G/DL (ref 3.5–5)
ALP BLD-CCNC: 77 U/L (ref 33–140)
ALT SERPL-CCNC: 44 U/L (ref 0–40)
ANION GAP SERPL CALCULATED.3IONS-SCNC: 9 MMOL/L (ref 5–13)
AST SERPL-CCNC: 30 U/L (ref 0–30)
BILIRUB SERPL-MCNC: 0.5 MG/DL (ref 0.2–1.2)
BUN / CREAT RATIO: 23
BUN BLDV-MCNC: 18 MG/DL (ref 7–25)
CALCIUM SERPL-MCNC: 9.8 MG/DL (ref 8.5–10.5)
CHLORIDE BLD-SCNC: 104 MMOL/L (ref 98–110)
CO2: 27 MMOL/L (ref 22–29)
CREAT SERPL-MCNC: 0.79 MG/DL (ref 0.5–1.2)
EGFR (CKD-EPI): 82 SEE NOTE
GLOBULIN: 2.8 G/DL (ref 2–3.7)
GLUCOSE TOLERANCE TEST FASTING: 158 MG/DL (ref 71–99)
HCT VFR BLD CALC: 43.4 % (ref 35–46)
HEMOGLOBIN: 13.8 G/DL (ref 11.7–15.5)
IRON SATURATION: 25 % (ref 15–50)
IRON: 68 UG/DL (ref 50–170)
MCH RBC QN AUTO: 31.2 PG (ref 27–33)
MCHC RBC AUTO-ENTMCNC: 31.8 G/DL (ref 30–36)
MCV RBC AUTO: 98 FL (ref 80–100)
PDW BLD-RTO: 12.3 % (ref 11–15)
PLATELET # BLD: 286 10*3/UL (ref 140–400)
PMV BLD AUTO: 9.8 FL (ref 9–13)
POTASSIUM SERPL-SCNC: 4.7 MMOL/L (ref 3.5–5.1)
RBC # BLD: 4.43 10*6/UL (ref 3.8–5.1)
SODIUM BLD-SCNC: 140 MMOL/L (ref 135–146)
TOTAL IRON BINDING CAPACITY: 271 UG/DL (ref 250–450)
TOTAL PROTEIN: 7 G/DL (ref 6–8)
TSH SERPL DL<=0.05 MIU/L-ACNC: 1.96 UIU/ML (ref 0.35–4.94)
WBC # BLD: 3.83 10*3/UL (ref 3.8–10.8)

## 2023-08-21 ENCOUNTER — TELEPHONE (OUTPATIENT)
Dept: DERMATOLOGY | Age: 68
End: 2023-08-21

## 2023-08-21 ENCOUNTER — OFFICE VISIT (OUTPATIENT)
Dept: DERMATOLOGY | Age: 68
End: 2023-08-21
Payer: COMMERCIAL

## 2023-08-21 DIAGNOSIS — L81.4 SOLAR LENTIGINOSIS: ICD-10-CM

## 2023-08-21 DIAGNOSIS — L65.0 TELOGEN EFFLUVIUM: Primary | ICD-10-CM

## 2023-08-21 DIAGNOSIS — L71.9 ROSACEA: ICD-10-CM

## 2023-08-21 PROCEDURE — 1123F ACP DISCUSS/DSCN MKR DOCD: CPT | Performed by: DERMATOLOGY

## 2023-08-21 PROCEDURE — 99213 OFFICE O/P EST LOW 20 MIN: CPT | Performed by: DERMATOLOGY

## 2023-08-21 RX ORDER — METRONIDAZOLE 7.5 MG/G
GEL TOPICAL
Qty: 45 G | Refills: 2 | Status: SHIPPED | OUTPATIENT
Start: 2023-08-21

## 2023-08-21 RX ORDER — OMEPRAZOLE 40 MG/1
40 CAPSULE, DELAYED RELEASE ORAL DAILY
COMMUNITY
Start: 2023-08-18

## 2023-08-21 NOTE — PROGRESS NOTES
[facility]  Kuldeep Eagle MD  [address]  1955    80 y.o. female     Date of Visit: 2023    Chief Complaint: telogen effluvium    History of Present Illness:    1. She returns today to follow-up for telogen effluvium likely from COVID/flu in February of this year. She reports that the shedding may have slowed down but still notices hair loss within the shower. Lab work-up was unremarkable. She used topical minoxidil 5% foam for only a short period of time. 2.  She has stable freckling on the extremities. 3.  She also reports a history of rosacea-papulopustular component controlled with metronidazole 0.75% gel. History of BCC on the chest about 15 years ago. Dad with history melanoma on the arm. Review of Systems:  Gen: Feels well, good sense of health. Past Medical History, Family History, Surgical History, Medications and Allergies reviewed. Past Medical History:   Diagnosis Date    Controlled type 2 diabetes mellitus without complication, without long-term current use of insulin ([address]) 10/12/2019    Essential hypertension 2018    HTN (hypertension)     Obstructive sleep apnea syndrome 2021    Old records reviewed, needs treatment    Prediabetes      Past Surgical History:   Procedure Laterality Date     SECTION      CHOLECYSTECTOMY, LAPAROSCOPIC      HERNIA REPAIR      MANDIBLE FRACTURE SURGERY      NASAL SEPTUM SURGERY         Allergies   Allergen Reactions    Codeine Nausea And Vomiting and Nausea Only    Penicillins Hives and Rash     Outpatient Medications Marked as Taking for the 23 encounter (Office Visit) with Anna Newell MD   Medication Sig Dispense Refill    omeprazole (PRILOSEC) 40 MG delayed release capsule Take 1 capsule by mouth daily      metroNIDAZOLE (METROCREAM) 0.75 % cream Apply to the face 2 times daily for rosacea.  45 g 3    vitamin E 1000 units capsule Take 1 capsule by mouth

## 2023-08-22 ENCOUNTER — OFFICE VISIT (OUTPATIENT)
Dept: PULMONOLOGY | Age: 68
End: 2023-08-22
Payer: COMMERCIAL

## 2023-08-22 VITALS
SYSTOLIC BLOOD PRESSURE: 122 MMHG | DIASTOLIC BLOOD PRESSURE: 70 MMHG | HEART RATE: 75 BPM | BODY MASS INDEX: 30.62 KG/M2 | OXYGEN SATURATION: 98 % | WEIGHT: 184 LBS

## 2023-08-22 DIAGNOSIS — G47.33 OBSTRUCTIVE SLEEP APNEA SYNDROME: Chronic | ICD-10-CM

## 2023-08-22 DIAGNOSIS — E11.9 CONTROLLED TYPE 2 DIABETES MELLITUS WITHOUT COMPLICATION, WITHOUT LONG-TERM CURRENT USE OF INSULIN (HCC): Chronic | ICD-10-CM

## 2023-08-22 DIAGNOSIS — E66.9 NON MORBID OBESITY, UNSPECIFIED OBESITY TYPE: Chronic | ICD-10-CM

## 2023-08-22 DIAGNOSIS — I10 ESSENTIAL HYPERTENSION: Chronic | ICD-10-CM

## 2023-08-22 PROCEDURE — 1123F ACP DISCUSS/DSCN MKR DOCD: CPT | Performed by: INTERNAL MEDICINE

## 2023-08-22 PROCEDURE — 3074F SYST BP LT 130 MM HG: CPT | Performed by: INTERNAL MEDICINE

## 2023-08-22 PROCEDURE — 3078F DIAST BP <80 MM HG: CPT | Performed by: INTERNAL MEDICINE

## 2023-08-22 PROCEDURE — 99214 OFFICE O/P EST MOD 30 MIN: CPT | Performed by: INTERNAL MEDICINE

## 2023-08-22 ASSESSMENT — SLEEP AND FATIGUE QUESTIONNAIRES
HOW LIKELY ARE YOU TO NOD OFF OR FALL ASLEEP WHILE SITTING AND READING: 2
HOW LIKELY ARE YOU TO NOD OFF OR FALL ASLEEP IN A CAR, WHILE STOPPED FOR A FEW MINUTES IN TRAFFIC: 0
HOW LIKELY ARE YOU TO NOD OFF OR FALL ASLEEP WHILE SITTING AND TALKING TO SOMEONE: 0
HOW LIKELY ARE YOU TO NOD OFF OR FALL ASLEEP WHEN YOU ARE A PASSENGER IN A CAR FOR AN HOUR WITHOUT A BREAK: 0
HOW LIKELY ARE YOU TO NOD OFF OR FALL ASLEEP WHILE LYING DOWN TO REST IN THE AFTERNOON WHEN CIRCUMSTANCES PERMIT: 1
HOW LIKELY ARE YOU TO NOD OFF OR FALL ASLEEP WHILE SITTING INACTIVE IN A PUBLIC PLACE: 0
HOW LIKELY ARE YOU TO NOD OFF OR FALL ASLEEP WHILE WATCHING TV: 1
HOW LIKELY ARE YOU TO NOD OFF OR FALL ASLEEP WHILE SITTING QUIETLY AFTER LUNCH WITHOUT ALCOHOL: 0
ESS TOTAL SCORE: 4

## 2023-08-22 NOTE — ASSESSMENT & PLAN NOTE
Chronic-Stable: Reviewed and analyzed results of physiologic download from patient's machine and reviewed with patient. Supplies and parts as needed for her machine. These are medically necessary. Limit caffeine use after 3pm. Based on the analyzed data will change following settings: EPAPmin-10  PS-4 see that helps the leak into her eyes, we also discussed mask liners.

## 2023-10-31 ENCOUNTER — PROCEDURE VISIT (OUTPATIENT)
Dept: DERMATOLOGY | Age: 68
End: 2023-10-31

## 2023-10-31 DIAGNOSIS — L71.9 ROSACEA: Primary | ICD-10-CM

## 2023-10-31 DIAGNOSIS — I78.1 TELANGIECTASIA: ICD-10-CM

## 2023-10-31 NOTE — PROGRESS NOTES
[facility]  Latonya Dolan MD  [address]  1955    80 y.o. female     Date of Visit: 10/31/2023    Last seen: Dr. Rajiv Bravo patient    Chief Complaint: here for laser  Chief Complaint   Patient presents with    Laser Treatment     rosacea       History of Present Illness:    Here for laser for telangiectasias/rosacea. Sees Dr. Gayle Clarke. Using metrogel with good control of PP component. Did laser once in the past - not me. No complications. No blood thinners. Review of Systems:  Gen: Feels well, good sense of health. Past Medical History, Family History, Surgical History, Medications and Allergies reviewed. Outpatient Medications Marked as Taking for the 10/31/23 encounter (Procedure visit) with Lucia Martinez MD   Medication Sig Dispense Refill    omeprazole (PRILOSEC) 40 MG delayed release capsule Take 1 capsule by mouth daily      metroNIDAZOLE (METROGEL) 0.75 % gel Apply to the face 2 times daily for rosacea.  45 g 2    vitamin E 1000 units capsule Take 1 capsule by mouth daily      ROGAINE WOMENS 5 % FOAM APPLY TOPICALLY TO THE SCALP EVERY NIGHT AT BEDTIME      metFORMIN (GLUCOPHAGE-XR) 500 MG extended release tablet Take 1 tablet by mouth in the morning and at bedtime      Vitamins C E (VITAMIN C & E COMPLEX PO) Take by mouth      losartan (COZAAR) 50 MG tablet Take 1 tablet by mouth daily      vitamin D 25 MCG (1000 UT) CAPS Take by mouth      Multiple Vitamins-Minerals (MULTIVITAMIN ADULTS PO) Take by mouth *Prenatal vitamins      Cyanocobalamin (VITAMIN B 12 PO) Take by mouth       Allergies   Allergen Reactions    Codeine Nausea And Vomiting and Nausea Only    Penicillins Hives and Rash       Past Medical History:   Diagnosis Date    Controlled type 2 diabetes mellitus without complication, without long-term current use of insulin ([address]) 10/12/2019    Essential hypertension 5/7/2018    HTN (hypertension)     Obstructive sleep apnea

## 2023-11-01 ENCOUNTER — TELEPHONE (OUTPATIENT)
Dept: DERMATOLOGY | Age: 68
End: 2023-11-01

## 2023-12-12 ENCOUNTER — PROCEDURE VISIT (OUTPATIENT)
Dept: DERMATOLOGY | Age: 68
End: 2023-12-12

## 2023-12-12 DIAGNOSIS — I78.1 TELANGIECTASIA: ICD-10-CM

## 2023-12-12 DIAGNOSIS — L71.9 ROSACEA: Primary | ICD-10-CM

## 2023-12-12 PROCEDURE — DM01315 VBEAM LASER MEDIUM OR 3 AREAS: Performed by: DERMATOLOGY

## 2023-12-12 NOTE — PATIENT INSTRUCTIONS
Following the procedure, the treated areas may be red or appear bruised and will likely be swollen for a few hours or up to a few days. The affected areas should be treated delicately following treatment. Discomfort and swelling should be treated with the application of hourly cool compresses the evening of the procedure. Avoid applying ice directly to the skin. If your face was treated, you may want to sleep with your head elevated on an extra pillow to help minimize swelling. Wear sunscreen daily and avoid strenuous activity following rosacea treatments to optimize results. Avoid extra aspirin, ibuprofen, vitamin E following the procedure - this may increase your chance of bruising. Improper care of the treated area while the discoloration is present may increase the chance of scarring or skin textural changes to the treated area. Please call the office if you have excessive discomfort, herpes simplex virus flare, or burns, blisters, or scabbing.     [address] [employer]

## 2023-12-12 NOTE — PROGRESS NOTES
[facility]  Juan Priest MD  [address] [place]  1955    80 y.o. female     Date of Visit: 12/12/2023    Last seen: Dr. Ame Ledesma patient    Chief Complaint: here for laser  Chief Complaint   Patient presents with    Procedure       History of Present Illness:    Here for laser for telangiectasias/rosacea. Sees Dr. Vianney Khan. Treated once~ 6 weeks ago () with good results and no complications. Notes cheeks are much less pink and some telangiectasias have cleared. Using metrogel with good control of PP component. Did laser once in the past before me. No complications. No blood thinners. Review of Systems:  Gen: Feels well, good sense of health. Past Medical History, Family History, Surgical History, Medications and Allergies reviewed. Outpatient Medications Marked as Taking for the 12/12/23 encounter (Procedure visit) with Quin Martinez MD   Medication Sig Dispense Refill    omeprazole (PRILOSEC) 40 MG delayed release capsule Take 1 capsule by mouth daily      metroNIDAZOLE (METROGEL) 0.75 % gel Apply to the face 2 times daily for rosacea.  45 g 2    vitamin E 1000 units capsule Take 1 capsule by mouth daily      ROGAINE WOMENS 5 % FOAM APPLY TOPICALLY TO THE SCALP EVERY NIGHT AT BEDTIME      metFORMIN (GLUCOPHAGE-XR) 500 MG extended release tablet Take 1 tablet by mouth in the morning and at bedtime      Vitamins C E (VITAMIN C & E COMPLEX PO) Take by mouth      losartan (COZAAR) 50 MG tablet Take 1 tablet by mouth daily      vitamin D 25 MCG (1000 UT) CAPS Take by mouth      Multiple Vitamins-Minerals (MULTIVITAMIN ADULTS PO) Take by mouth *Prenatal vitamins      Cyanocobalamin (VITAMIN B 12 PO) Take by mouth       Allergies   Allergen Reactions    Codeine Nausea And Vomiting and Nausea Only    Penicillins Hives and Rash       Past Medical History:   Diagnosis Date    Controlled type 2 diabetes mellitus without complication, without

## 2024-01-09 ENCOUNTER — OFFICE VISIT (OUTPATIENT)
Dept: DERMATOLOGY | Age: 69
End: 2024-01-09
Payer: COMMERCIAL

## 2024-01-09 DIAGNOSIS — L65.0 TELOGEN EFFLUVIUM: Primary | ICD-10-CM

## 2024-01-09 PROCEDURE — 1123F ACP DISCUSS/DSCN MKR DOCD: CPT | Performed by: DERMATOLOGY

## 2024-01-09 PROCEDURE — 99213 OFFICE O/P EST LOW 20 MIN: CPT | Performed by: DERMATOLOGY

## 2024-01-09 NOTE — PROGRESS NOTES
[facility]  Gm Newell MD  [phone]      Chloe Carroll  1955    68 y.o. female     Date of Visit: 2024    Chief Complaint: telogen effluvium    History of Present Illness:    She returns today to follow up for telogen effluvium that began nearly 1 year ago.  It has slowed down in terms of shedding.  She only used minoxidil topically for about 1 month.  She has been taking supplements.          Review of Systems:  Gen: Feels well, good sense of health.      Past Medical History, Family History, Surgical History, Medications and Allergies reviewed.    Past Medical History:   Diagnosis Date    Controlled type 2 diabetes mellitus without complication, without long-term current use of insulin (HCC) 10/12/2019    Essential hypertension 2018    HTN (hypertension)     Obstructive sleep apnea syndrome 2021    Old records reviewed, needs treatment    Prediabetes      Past Surgical History:   Procedure Laterality Date     SECTION      CHOLECYSTECTOMY, LAPAROSCOPIC      HERNIA REPAIR      MANDIBLE FRACTURE SURGERY      NASAL SEPTUM SURGERY         Allergies   Allergen Reactions    Codeine Nausea And Vomiting and Nausea Only    Penicillins Hives and Rash     Outpatient Medications Marked as Taking for the 24 encounter (Office Visit) with Gm Newell MD   Medication Sig Dispense Refill    omeprazole (PRILOSEC) 40 MG delayed release capsule Take 1 capsule by mouth daily      metroNIDAZOLE (METROGEL) 0.75 % gel Apply to the face 2 times daily for rosacea. 45 g 2    vitamin E 1000 units capsule Take 1 capsule by mouth daily      ROGAINE WOMENS 5 % FOAM APPLY TOPICALLY TO THE SCALP EVERY NIGHT AT BEDTIME      metFORMIN (GLUCOPHAGE-XR) 500 MG extended release tablet Take 1 tablet by mouth in the morning and at bedtime      Vitamins C E (VITAMIN C & E COMPLEX PO) Take by mouth      losartan (COZAAR) 50 MG tablet Take 1 tablet by mouth daily      vitamin D 25 MCG

## 2024-01-23 ENCOUNTER — PROCEDURE VISIT (OUTPATIENT)
Dept: DERMATOLOGY | Age: 69
End: 2024-01-23

## 2024-01-23 DIAGNOSIS — I78.1 TELANGIECTASIA: ICD-10-CM

## 2024-01-23 DIAGNOSIS — L71.9 ROSACEA: Primary | ICD-10-CM

## 2024-01-23 PROCEDURE — DM01505 PIGMENTED LASER 2-5 LESIONS: Performed by: DERMATOLOGY

## 2024-01-23 NOTE — PROGRESS NOTES
[facility]  Suzanne Martinez MD  [phone]      Chloe Carroll  1955    68 y.o. female     Date of Visit: 1/23/2024    Last seen: Dr. Newell's patient    Chief Complaint: here for laser  Chief Complaint   Patient presents with    Procedure     *son getting  in June 2024    History of Present Illness:    Here for laser for telangiectasias/rosacea.  Sees Dr. Newell.  Treated x 2 -  and  with good results and no complications.    Notes cheeks are much less pink and many telangiectasias have cleared.    Mainly has telang remaining on the ala and chin.      Using metrogel with good control of PP component.  Did laser once in the past before me.  No complications.  No blood thinners.    Review of Systems:  Gen: Feels well, good sense of health.      Past Medical History, Family History, Surgical History, Medications and Allergies reviewed.    Outpatient Medications Marked as Taking for the 1/23/24 encounter (Procedure visit) with Suzanne Martinez MD   Medication Sig Dispense Refill    omeprazole (PRILOSEC) 40 MG delayed release capsule Take 1 capsule by mouth daily      metroNIDAZOLE (METROGEL) 0.75 % gel Apply to the face 2 times daily for rosacea. 45 g 2    vitamin E 1000 units capsule Take 1 capsule by mouth daily      ROGAINE WOMENS 5 % FOAM APPLY TOPICALLY TO THE SCALP EVERY NIGHT AT BEDTIME      metFORMIN (GLUCOPHAGE-XR) 500 MG extended release tablet Take 1 tablet by mouth in the morning and at bedtime      Vitamins C E (VITAMIN C & E COMPLEX PO) Take by mouth      losartan (COZAAR) 50 MG tablet Take 1 tablet by mouth daily      vitamin D 25 MCG (1000 UT) CAPS Take by mouth      Multiple Vitamins-Minerals (MULTIVITAMIN ADULTS PO) Take by mouth *Prenatal vitamins      Cyanocobalamin (VITAMIN B 12 PO) Take by mouth       Allergies   Allergen Reactions    Codeine Nausea And Vomiting and Nausea Only    Penicillins Hives and Rash       Past Medical History:

## 2024-02-20 ENCOUNTER — OFFICE VISIT (OUTPATIENT)
Dept: PULMONOLOGY | Age: 69
End: 2024-02-20
Payer: COMMERCIAL

## 2024-02-20 VITALS
OXYGEN SATURATION: 98 % | HEART RATE: 82 BPM | SYSTOLIC BLOOD PRESSURE: 119 MMHG | WEIGHT: 183 LBS | BODY MASS INDEX: 30.45 KG/M2 | DIASTOLIC BLOOD PRESSURE: 74 MMHG

## 2024-02-20 DIAGNOSIS — E11.9 CONTROLLED TYPE 2 DIABETES MELLITUS WITHOUT COMPLICATION, WITHOUT LONG-TERM CURRENT USE OF INSULIN (HCC): ICD-10-CM

## 2024-02-20 DIAGNOSIS — E66.9 NON MORBID OBESITY, UNSPECIFIED OBESITY TYPE: ICD-10-CM

## 2024-02-20 DIAGNOSIS — G47.33 OBSTRUCTIVE SLEEP APNEA SYNDROME: Primary | ICD-10-CM

## 2024-02-20 DIAGNOSIS — I10 ESSENTIAL HYPERTENSION: ICD-10-CM

## 2024-02-20 PROCEDURE — 99214 OFFICE O/P EST MOD 30 MIN: CPT | Performed by: NURSE PRACTITIONER

## 2024-02-20 PROCEDURE — 3078F DIAST BP <80 MM HG: CPT | Performed by: NURSE PRACTITIONER

## 2024-02-20 PROCEDURE — 1123F ACP DISCUSS/DSCN MKR DOCD: CPT | Performed by: NURSE PRACTITIONER

## 2024-02-20 PROCEDURE — 3074F SYST BP LT 130 MM HG: CPT | Performed by: NURSE PRACTITIONER

## 2024-02-20 ASSESSMENT — SLEEP AND FATIGUE QUESTIONNAIRES
HOW LIKELY ARE YOU TO NOD OFF OR FALL ASLEEP WHILE SITTING INACTIVE IN A PUBLIC PLACE: 0
HOW LIKELY ARE YOU TO NOD OFF OR FALL ASLEEP WHILE SITTING QUIETLY AFTER LUNCH WITHOUT ALCOHOL: 0
HOW LIKELY ARE YOU TO NOD OFF OR FALL ASLEEP WHILE LYING DOWN TO REST IN THE AFTERNOON WHEN CIRCUMSTANCES PERMIT: 0
HOW LIKELY ARE YOU TO NOD OFF OR FALL ASLEEP WHILE SITTING AND TALKING TO SOMEONE: 0
HOW LIKELY ARE YOU TO NOD OFF OR FALL ASLEEP WHILE WATCHING TV: 1
HOW LIKELY ARE YOU TO NOD OFF OR FALL ASLEEP IN A CAR, WHILE STOPPED FOR A FEW MINUTES IN TRAFFIC: 0
HOW LIKELY ARE YOU TO NOD OFF OR FALL ASLEEP WHEN YOU ARE A PASSENGER IN A CAR FOR AN HOUR WITHOUT A BREAK: 1
ESS TOTAL SCORE: 3
HOW LIKELY ARE YOU TO NOD OFF OR FALL ASLEEP WHILE SITTING AND READING: 1

## 2024-02-20 NOTE — ASSESSMENT & PLAN NOTE
Chronic - Stable: Reviewed and analyzed results of physiologic download from patient's machine and reviewed with patients. Supplies and parts as needed for the machine. These are medically necessary. Limit caffeine use after 3 PM. Based on the analyzed data, we will continue with current settings. Discussed trial of a mask liner to see if it helps with reducing leak into her eyes. A brochure was provided for reference. Also discussed trial of different styles of masks/headgear for comfort, including the hybrid mask, to see if it helps preventing air shooting into her eyes. Reviewed a few options and resources were provided. Recommended in office mask fitting at her [facility] to ensure proper fit of the mask. Instructed her to take her machine to mask fitting. Encouraged her to adjust humidity settings on the machine for dryness and comfort. Also suggested using a saline nasal spray for dryness in her nose. We will try these first to see if it helps with reducing leak into her eyes. She will return in 6 mo for follow up. Instructed her to return sooner or contact the office if experiences new or worsening of symptoms. Encouraged her to continue to use her machine each night. Encouraged the patient to contact the office with any questions or concerns.

## 2024-02-20 NOTE — PROGRESS NOTES
Jeovanny Blackwell CNP  Gala Brunner CNP [facility]  [address]  [address]  [place]  [address]  P- [phone]   [facility]  [address]  [address]  [place] [address]  F- [phone]     [facility]  [facility]  [address]  [address]  [facility] [address]  Dept: [phone]  Dept Fax: [phone]  Loc: [phone]      Assessment/Plan:      1. Obstructive sleep apnea syndrome  Assessment & Plan:  Chronic - Stable: Reviewed and analyzed results of physiologic download from patient's machine and reviewed with patients. Supplies and parts as needed for the machine. These are medically necessary. Limit caffeine use after 3 PM. Based on the analyzed data, we will continue with current settings. Discussed trial of a mask liner to see if it helps with reducing leak into her eyes. A brochure was provided for reference. Also discussed trial of different styles of masks/headgear for comfort, including the hybrid mask, to see if it helps preventing air shooting into her eyes. Reviewed a few options and resources were provided. Recommended in office mask fitting at her DME to ensure proper fit of the mask. Instructed her to take her machine to mask fitting. Encouraged her to adjust humidity settings on the machine for dryness and comfort. Also suggested using a saline nasal spray for dryness in her nose. We will try these first to see if it helps with reducing leak into her eyes. She will return in 6 mo for follow up. Instructed her to return sooner or contact the office if experiences new or worsening of symptoms. Encouraged her to continue to use her machine each night. Encouraged the patient to contact the office with any questions or concerns.  2. Essential hypertension  Assessment & Plan:   Chronic- Stable.  Discussed the importance of treating sleep apnea as part of the management of this disorder.  Cont any meds per

## 2024-02-20 NOTE — PROGRESS NOTES
Diagnosis: [x] ZEUS (G47.33) [] CSA (G47.31) [] Apnea (G47.30)   Length of Need: [x] 18 Months [] 99 Months [] Other:   Machine (HONEY!): [] Respironics Dream Station   2   Auto [] ResMed AirSense     Auto 11 [] Other:     []  CPAP () [] Bilevel ()   Mode: [] Auto [] Spontaneous    Mode: [] Auto [] Spontaneous             Comfort Settings:      Humidifier: [] Heated ()        [x] Water chamber replacement ()/ 1 per 6 months        Mask:   [] Nasal () /1 per 3 months [x] Full Face () /1 per 3 months   [] Patient choice -Size and fit mask [x] Patient Choice - Size and fit mask   [] Dispense: [] Dispense:   [] Headgear () / 1 per 3 months [x] Headgear () / 1 per 3 months   [] Replacement Nasal Cushion ()/2 per month [x] Interface Replacement ()/1 per month   [] Replacement Nasal Pillows ()/2 per month         Tubing: [x] Heated ()/1 per 3 months    [] Standard ()/1 per 3 months [] Other:           Filters: [x] Non-disposable ()/1 per 6 months     [x] Ultra-Fine, Disposable ()/2 per month        Miscellaneous: [] Chin Strap ()/ 1 per 6 months [] O2 bleed-in:        LPM   [] Oxymetry on CPAP/Bilevel [x]  Other: Modem: ()         Start Order Date: 24    MEDICAL JUSTIFICATION:  I, the undersigned, certify that the above prescribed supplies are medically necessary for this patient’s wellbeing.  In my opinion, the supplies are both reasonable and necessary in reference to accepted standards of medicalpractice in treatment of this patient’s condition.    Gala Brunenr CNP    NPI: [id number]     Order Signed Date: 24    Chloe Carroll  1955  [address] [facility] [address]  [phone] (home)   [phone] (mobile)      Insurance Info (confirm with patient if correct):  Payer/Plan Subscr  Sex Relation Sub. Ins. ID Effective Group Num

## 2024-07-10 ENCOUNTER — OFFICE VISIT (OUTPATIENT)
Dept: DERMATOLOGY | Age: 69
End: 2024-07-10
Payer: COMMERCIAL

## 2024-07-10 DIAGNOSIS — L65.8 FEMALE PATTERN HAIR LOSS: Primary | ICD-10-CM

## 2024-07-10 DIAGNOSIS — L65.0 TELOGEN EFFLUVIUM: ICD-10-CM

## 2024-07-10 PROCEDURE — 1123F ACP DISCUSS/DSCN MKR DOCD: CPT | Performed by: DERMATOLOGY

## 2024-07-10 PROCEDURE — 99213 OFFICE O/P EST LOW 20 MIN: CPT | Performed by: DERMATOLOGY

## 2024-07-10 RX ORDER — MINOXIDIL 2.5 MG/1
1.25 TABLET ORAL DAILY
Qty: 60 TABLET | Refills: 2 | Status: SHIPPED | OUTPATIENT
Start: 2024-07-10

## 2024-07-10 NOTE — PROGRESS NOTES
[facility]  Gm Newell MD  [phone]      Chloe Carroll  1955    69 y.o. female     Date of Visit: 7/10/2024    Chief Complaint: hair loss    History of Present Illness:    1.  She returns today for chronic hair loss.  He remains thin status post episodes of telogen effluvium over the past couple of years after developing COVID.  She used topical 5% minoxidil for 3 mos with minimal appreciable improvement.    History of BCC on the chest about 15 years ago.      Dad with history melanoma on the arm.        Review of Systems:  Gen: Feels well, good sense of health.    Past Medical History, Family History, Surgical History, Medications and Allergies reviewed.    Past Medical History:   Diagnosis Date    Controlled type 2 diabetes mellitus without complication, without long-term current use of insulin (HCC) 10/12/2019    Essential hypertension 2018    HTN (hypertension)     Obstructive sleep apnea syndrome 2021    Old records reviewed, needs treatment    Prediabetes      Past Surgical History:   Procedure Laterality Date     SECTION      CHOLECYSTECTOMY, LAPAROSCOPIC      HERNIA REPAIR      MANDIBLE FRACTURE SURGERY      NASAL SEPTUM SURGERY         Allergies   Allergen Reactions    Codeine Nausea And Vomiting and Nausea Only    Penicillins Hives and Rash     Outpatient Medications Marked as Taking for the 7/10/24 encounter (Office Visit) with Gm Newell MD   Medication Sig Dispense Refill    minoxidil (LONITEN) 2.5 MG tablet Take 0.5 tablets by mouth daily 60 tablet 2    omeprazole (PRILOSEC) 40 MG delayed release capsule Take 1 capsule by mouth daily      metroNIDAZOLE (METROGEL) 0.75 % gel Apply to the face 2 times daily for rosacea. 45 g 2    vitamin E 1000 units capsule Take 1 capsule by mouth daily      ROGAINE WOMENS 5 % FOAM APPLY TOPICALLY TO THE SCALP EVERY NIGHT AT BEDTIME      metFORMIN (GLUCOPHAGE-XR) 500 MG extended release tablet Take 1 tablet

## 2024-08-15 ENCOUNTER — TELEPHONE (OUTPATIENT)
Dept: DERMATOLOGY | Age: 69
End: 2024-08-15

## 2024-08-15 RX ORDER — MINOXIDIL 2.5 MG/1
1.25 TABLET ORAL DAILY
Qty: 60 TABLET | Refills: 2 | Status: SHIPPED | OUTPATIENT
Start: 2024-08-15

## 2024-08-15 NOTE — TELEPHONE ENCOUNTER
Pt would like a refill of her meroxidil called into [facility] pharmacy at [phone]

## 2025-01-21 ENCOUNTER — OFFICE VISIT (OUTPATIENT)
Age: 70
End: 2025-01-21
Payer: COMMERCIAL

## 2025-01-21 DIAGNOSIS — L65.8 FEMALE PATTERN HAIR LOSS: Primary | ICD-10-CM

## 2025-01-21 DIAGNOSIS — L81.4 SOLAR LENTIGINOSIS: ICD-10-CM

## 2025-01-21 PROCEDURE — 1123F ACP DISCUSS/DSCN MKR DOCD: CPT | Performed by: DERMATOLOGY

## 2025-01-21 PROCEDURE — 99213 OFFICE O/P EST LOW 20 MIN: CPT | Performed by: DERMATOLOGY

## 2025-01-21 RX ORDER — ATORVASTATIN CALCIUM 40 MG/1
40 TABLET, FILM COATED ORAL DAILY
COMMUNITY
Start: 2024-11-14

## 2025-01-21 NOTE — PROGRESS NOTES
[facility]  Gm Newell MD  [phone]      Chloe Carroll  1955    69 y.o. 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  She has been on low-dose oral minoxidil 1.25 mg daily since last visit 6 months ago.  Today she reports an increased thickness in her hair along with less shedding.      She denies any lightheadedness, headaches or lower extremity edema.    History of BCC on the chest about 15 years ago.      Dad with history melanoma on the arm.      Review of Systems:  Gen: Feels well, good sense of health.      Past Medical History, Family History, Surgical History, Medications and Allergies reviewed.    Past Medical History:   Diagnosis Date    Controlled type 2 diabetes mellitus without complication, without long-term current use of insulin (HCC) 10/12/2019    Essential hypertension 2018    HTN (hypertension)     Obstructive sleep apnea syndrome 2021    Old records reviewed, needs treatment    Prediabetes      Past Surgical History:   Procedure Laterality Date     SECTION      CHOLECYSTECTOMY, LAPAROSCOPIC      HERNIA REPAIR      MANDIBLE FRACTURE SURGERY      NASAL SEPTUM SURGERY         Allergies   Allergen Reactions    Codeine Nausea And Vomiting and Nausea Only    Penicillins Hives and Rash     Outpatient Medications Marked as Taking for the 25 encounter (Office Visit) with Gm Newell MD   Medication Sig Dispense Refill    atorvastatin (LIPITOR) 40 MG tablet Take 1 tablet by mouth daily      minoxidil (LONITEN) 2.5 MG tablet Take 0.5 tablets by mouth daily 60 tablet 2    metroNIDAZOLE (METROGEL) 0.75 % gel Apply to the face 2 times daily for rosacea. 45 g 2    vitamin E 1000 units capsule Take 1 capsule by mouth daily      metFORMIN (GLUCOPHAGE-XR) 500 MG extended

## 2025-01-22 RX ORDER — MINOXIDIL 2.5 MG/1
1.25 TABLET ORAL DAILY
Qty: 60 TABLET | Refills: 2 | Status: SHIPPED | OUTPATIENT
Start: 2025-01-22

## 2025-01-22 RX ORDER — METRONIDAZOLE 7.5 MG/G
GEL TOPICAL
Qty: 45 G | Refills: 2 | Status: SHIPPED | OUTPATIENT
Start: 2025-01-22